# Patient Record
Sex: FEMALE | Race: BLACK OR AFRICAN AMERICAN | Employment: PART TIME | URBAN - METROPOLITAN AREA
[De-identification: names, ages, dates, MRNs, and addresses within clinical notes are randomized per-mention and may not be internally consistent; named-entity substitution may affect disease eponyms.]

---

## 2017-09-11 ENCOUNTER — OFFICE VISIT (OUTPATIENT)
Dept: FAMILY MEDICINE CLINIC | Age: 57
End: 2017-09-11

## 2017-09-11 VITALS
HEART RATE: 70 BPM | BODY MASS INDEX: 44.46 KG/M2 | HEIGHT: 62 IN | TEMPERATURE: 97.3 F | OXYGEN SATURATION: 100 % | RESPIRATION RATE: 17 BRPM | DIASTOLIC BLOOD PRESSURE: 62 MMHG | SYSTOLIC BLOOD PRESSURE: 122 MMHG | WEIGHT: 241.6 LBS

## 2017-09-11 DIAGNOSIS — E55.9 VITAMIN D DEFICIENCY: ICD-10-CM

## 2017-09-11 DIAGNOSIS — Z85.3 HISTORY OF LEFT BREAST CANCER: ICD-10-CM

## 2017-09-11 DIAGNOSIS — Z12.4 CERVICAL CANCER SCREENING: ICD-10-CM

## 2017-09-11 DIAGNOSIS — Z23 ENCOUNTER FOR IMMUNIZATION: ICD-10-CM

## 2017-09-11 DIAGNOSIS — E78.2 MIXED HYPERLIPIDEMIA: ICD-10-CM

## 2017-09-11 DIAGNOSIS — R74.01 TRANSAMINITIS: ICD-10-CM

## 2017-09-11 RX ORDER — METFORMIN HYDROCHLORIDE 500 MG/1
500 TABLET, EXTENDED RELEASE ORAL
Qty: 30 TAB | Refills: 2 | Status: SHIPPED | OUTPATIENT
Start: 2017-09-11 | End: 2017-09-22 | Stop reason: SDUPTHER

## 2017-09-11 RX ORDER — LISINOPRIL 5 MG/1
TABLET ORAL DAILY
COMMUNITY

## 2017-09-11 RX ORDER — ATORVASTATIN CALCIUM 20 MG/1
TABLET, FILM COATED ORAL DAILY
COMMUNITY
End: 2017-09-22 | Stop reason: SDUPTHER

## 2017-09-11 RX ORDER — INSULIN GLARGINE 100 [IU]/ML
INJECTION, SOLUTION SUBCUTANEOUS
COMMUNITY
End: 2017-09-22 | Stop reason: SDUPTHER

## 2017-09-11 RX ORDER — GLIPIZIDE 10 MG/1
5 TABLET ORAL 2 TIMES DAILY
COMMUNITY
End: 2018-06-07 | Stop reason: SDUPTHER

## 2017-09-11 NOTE — LETTER
09/12/17 Melissa Sanford [address] [facility] [address] Dear Melissa Sanford, Welcome to [facility] and thank you for choosing me as your primary care physician. My staff is a skilled and caring team who is committed to providing patients with the best care possible within their new medical home. As a local physician I am excited about welcoming new patients to my practice as well as developing long lasting relationships with established patients and their friends and family members. I look forward to a continued relationship of care, compassion and trust with you. If you have family members or friends who may be searching for a new doctor I would be happy to see them. They may contact my staff and schedule an appointment convenient with their schedule by calling [phone]. My schedule is flexible and offers both early morning appointments beginning at 8:00am as well as same day visits. We understand the busy schedules of life and will work together to assure that their experience is pleasant and meets their medical needs. Thank you for giving me the opportunity to care for you. I look forward to seeing you again and welcome your friends and family as part of our medical home.    
 
 
Sincerely, 
 
 
Salvador Gardner MD

## 2017-09-11 NOTE — PROGRESS NOTES
Louise Rascon is a 62 y.o.  female and presents with Establish Care and Diabetes       Subjective:    Diabetes type 2- not controlled by serum glucose- no recent A1c. No checks of bs at home. Not using lantus. Chronic vertigo- on meclizine. Hyperlipidemia- taking lipitor. No myalgia or abd pain  Obesity- struggles with wt loss. transaminitis- told this was fatty liver. No abd pain. Not sure about other testing for this but had u/s she reports. Vit D deficiency- no longer on a supplement. Assessment/Plan:    Diabetes type 2- need labs but seems uncontrolled. Chronic vertigo- need old records. Referral to ENT. Hyperlipidemia- continue statin. Obesity- encouraged calorie counting and calorie restriction to 1 lbs/week wt loss discussed. Regular wt check discussed- pt has a scale. rtc in after fasting labs done. Diagnoses and all orders for this visit:    1. Uncontrolled type 2 diabetes mellitus with diabetic nephropathy, with long-term current use of insulin (HCC)  -     METABOLIC PANEL, COMPREHENSIVE; Future  -     LIPID PANEL; Future  -     HEMOGLOBIN A1C WITH EAG; Future  -     URINALYSIS W/ RFLX MICROSCOPIC; Future  -     MICROALBUMIN, UR, RAND W/ MICROALBUMIN/CREA RATIO; Future  -     REFERRAL TO DIETITIAN    2. History of left breast cancer    3. Vitamin D deficiency  -     VITAMIN D, 25 HYDROXY; Future    4. Transaminitis  -     METABOLIC PANEL, COMPREHENSIVE; Future  -     CERULOPLASMIN; Future  -     ALPHA-1-ANTITRYPSIN, TOTAL; Future  -     FERRITIN; Future  -     HEP B SURFACE AG; Future  -     HEPATITIS C AB; Future  -     REFERRAL TO DIETITIAN    5. Mixed hyperlipidemia  -     REFERRAL TO DIETITIAN    6. Cervical cancer screening  -     REFERRAL TO GYNECOLOGY    7. Encounter for immunization  -     Influenza virus vaccine (QUADRIVALENT PRES FREE SYRINGE) IM (38337)    Other orders  -     metFORMIN ER (GLUCOPHAGE XR) 500 mg tablet; Take 1 Tab by mouth daily (with dinner).   - HEMOGLOBIN A1C W/O EAG          ROS:  Constitutional: No recent weight change. No weakness/fatigue. No f/c. Skin: No rashes, change in nails/hair, itching   HENT: No HA, dizziness. No hearing loss/tinnitus. No nasal congestion/discharge. Eyes: No change in vision, double/blurred vision or eye pain/redness. Cardiovascular: No CP/palpitations. No FARAH/orthopnea/PND. Respiratory: No cough/sputum, dyspnea, wheezing. Gastointestinal: No dysphagia, reflux. No n/v. No constipation/diarrhea. No melena/rectal bleeding. Genitourinary: No dysuria, urinary hesitancy, nocturia, hematuria. No incontinence. Musculoskeletal: No joint pain/stiffness. No muscle pain/tenderness. Endo: No heat/cold intolerance, no polyuria/polydypsia. Heme: No h/o anemia. No easy bleeding/bruising. Allergy/Immunology: No seasonal rhinitis. Denies frequent colds, sinus/ear infections. Neurological: No seizures/numbness/weakness. No paresthesias. Psychiatric:  No depression, anxiety. PMH:  Past Medical History:   Diagnosis Date    Breast cancer ([facility]     Left    Cancer ([facility] [facility]     Diabetes ([facility] [facility]     Hypercholesterolemia     Ulcerative colitis ([facility] [address]        There is no problem list on file for this patient. PSH:  Past Surgical History:   Procedure Laterality Date    BREAST SURGERY PROCEDURE UNLISTED Left     Tumor removed    HX APPENDECTOMY          SH:  Social History   Substance Use Topics    Smoking status: Never Smoker    Smokeless tobacco: Never Used    Alcohol use No       FH:  Family History   Problem Relation Age of Onset    Alzheimer Mother     Diabetes Father     Hypertension Father     No Known Problems Sister     No Known Problems Brother      Medications/Allergies:    Current Outpatient Prescriptions:     lisinopril (PRINIVIL, ZESTRIL) 5 mg tablet, Take  by mouth daily. , Disp: , Rfl:     glipiZIDE (GLUCOTROL) 10 mg tablet, 10 mg daily. , Disp: , Rfl:     atorvastatin (LIPITOR) 20 mg tablet, Take  by mouth daily. , Disp: , Rfl:     insulin glargine (LANTUS SOLOSTAR) 100 unit/mL (3 mL) inpn, by SubCUTAneous route., Disp: , Rfl:     No Known Allergies    Objective:  Visit Vitals    /62    Pulse 70    Temp 97.3 °F (36.3 °C)    Resp 17    Ht 5' 2\" (1.575 m)    Wt 241 lb 9.6 oz (109.6 kg)    SpO2 100%    BMI 44.19 kg/m2    Body mass index is 44.19 kg/(m^2). Constitutional: Well developed, nourished, no distress, alert, obese. HENT: Exterior ears and tympanic membranes normal bilaterally. Supple neck. No thyromegaly or lymphadenopathy. Oropharynx clear and moist mucous membranes. Eyes: Conjunctiva normal. PERRL. Cardiovascular: S1, S2.  RRR. No murmurs/rubs. No thrills palpated. No carotid bruits. Intact distal pulses. No edema. Pulmonary/Chest Wall: No abnormalities on inspection. Clear to auscultation bilaterally. No wheezing/rhonchi. Normal effort. GI: Soft, nontender, nondistended. Normal active bowel sounds. No  masses on palpation. No hepatosplenomegaly. Musculoskeletal: Gait normal.  Joints without deformity/tenderness. Strength intact bilateral upper and lower ext. Normal ROM all extremities. Neurological: Appropriate. 2+DTR. No focal motor or sensory deficits. Speech normal.   Skin: No lesions/rashes on inspection. Psych: Appropriate affect, judgement and insight. Short-term memory intact.              Health Maintenance:   Health Maintenance   Topic Date Due    Hepatitis C Screening  1960    DTaP/Tdap/Td series (1 - Tdap) 07/08/1981    FOBT Q 1 YEAR AGE 50-75  07/08/2010    INFLUENZA AGE 9 TO ADULT  08/01/2017    BREAST CANCER SCRN MAMMOGRAM  06/16/2019    PAP AKA CERVICAL CYTOLOGY  06/01/2020       Orders Placed This Encounter    METABOLIC PANEL, COMPREHENSIVE     Standing Status:   Future     Standing Expiration Date:   9/12/2018    LIPID PANEL     Standing Status:   Future     Standing Expiration Date:   9/12/2018   [address] [place] [address] HEMOGLOBIN A1C WITH EAG     Standing Status:   Future     Standing Expiration Date:   9/12/2018    URINALYSIS W/ RFLX MICROSCOPIC     Standing Status:   Future     Standing Expiration Date:   9/12/2018    MICROALBUMIN, UR, RAND W/ MICROALBUMIN/CREA RATIO     Standing Status:   Future     Standing Expiration Date:   9/12/2018    VITAMIN D, 25 HYDROXY     Standing Status:   Future     Standing Expiration Date:   9/11/2018    CERULOPLASMIN     Standing Status:   Future     Standing Expiration Date:   9/12/2018    ALPHA-1-ANTITRYPSIN, TOTAL     Standing Status:   Future     Standing Expiration Date:   9/12/2018    FERRITIN     Standing Status:   Future     Standing Expiration Date:   9/12/2018    HEP B SURFACE AG     Standing Status:   Future     Standing Expiration Date:   9/12/2018    HEPATITIS C AB     Standing Status:   Future     Standing Expiration Date:   9/12/2018    REFERRAL TO DIETITIAN     Referral Priority:   Routine     Referral Type:   Consultation     Referral Reason:   Specialty Services Required     Requested Specialty:   Dietitian    REFERRAL TO GYNECOLOGY     Referral Priority:   Routine     Referral Type:   Consultation     Referral Reason:   Specialty Services Required     Referral Location:   [facility] [place]     Referred to Provider:   Cindy Mcclellan MD    lisinopril (PRINIVIL, ZESTRIL) 5 mg tablet     Sig: Take  by mouth daily.  glipiZIDE (GLUCOTROL) 10 mg tablet     Sig: 10 mg daily.  atorvastatin (LIPITOR) 20 mg tablet     Sig: Take  by mouth daily.  insulin glargine (LANTUS SOLOSTAR) 100 unit/mL (3 mL) inpn     Sig: by SubCUTAneous route.  metFORMIN ER (GLUCOPHAGE XR) 500 mg tablet     Sig: Take 1 Tab by mouth daily (with dinner). Dispense:  30 Tab     Refill:  2   Diagnoses and all orders for this visit:    1.  Uncontrolled type 2 diabetes mellitus with diabetic nephropathy, with long-term current use of insulin (HCC)  -     METABOLIC PANEL, COMPREHENSIVE; Future  -     LIPID PANEL; Future  -     HEMOGLOBIN A1C WITH EAG; Future  -     URINALYSIS W/ RFLX MICROSCOPIC; Future  -     MICROALBUMIN, UR, RAND W/ MICROALBUMIN/CREA RATIO; Future  -     REFERRAL TO DIETITIAN    2. History of left breast cancer    3. Vitamin D deficiency  -     VITAMIN D, 25 HYDROXY; Future    4. Transaminitis  -     METABOLIC PANEL, COMPREHENSIVE; Future  -     CERULOPLASMIN; Future  -     ALPHA-1-ANTITRYPSIN, TOTAL; Future  -     FERRITIN; Future  -     HEP B SURFACE AG; Future  -     HEPATITIS C AB; Future  -     REFERRAL TO DIETITIAN    5. Mixed hyperlipidemia  -     REFERRAL TO DIETITIAN    6. Cervical cancer screening  -     REFERRAL TO GYNECOLOGY    7. Encounter for immunization  -     Influenza virus vaccine (QUADRIVALENT PRES FREE SYRINGE) IM (03121)    Other orders  -     metFORMIN ER (GLUCOPHAGE XR) 500 mg tablet; Take 1 Tab by mouth daily (with dinner).   -     HEMOGLOBIN A1C W/O EAG

## 2017-09-11 NOTE — MR AVS SNAPSHOT
Visit Information Date & Time Provider Department Dept. Phone Encounter #  
 9/11/2017 10:00 AM Starr Malik, [address] [facility] [phone] [id number] Follow-up Instructions Return in about 4 weeks (around 10/9/2017) for 30 minute slot and fasting labs in the interim. Neeraj Cagle Upcoming Health Maintenance Date Due Hepatitis C Screening 1960 DTaP/Tdap/Td series (1 - Tdap) 7/8/1981 FOBT Q 1 YEAR AGE 50-75 7/8/2010 INFLUENZA AGE 9 TO ADULT 8/1/2017 BREAST CANCER SCRN MAMMOGRAM 6/16/2019 PAP AKA CERVICAL CYTOLOGY 6/1/2020 Allergies as of 9/11/2017  Review Complete On: 9/11/2017 By: Starr Malik MD  
 No Known Allergies Current Immunizations  Never Reviewed No immunizations on file. Not reviewed this visit You Were Diagnosed With   
  
 Codes Comments Uncontrolled type 2 diabetes mellitus with diabetic nephropathy, with long-term current use of insulin [facility]    -  Primary ICD-10-CM: E11.21, E11.65, Z79.4 ICD-9-CM: 250.42, 583.81, V58.67 History of left breast cancer     ICD-10-CM: Z85.3 ICD-9-CM: V10.3 Vitamin D deficiency     ICD-10-CM: E55.9 ICD-9-CM: 268.9 Transaminitis     ICD-10-CM: R74.0 ICD-9-CM: 790.4 Mixed hyperlipidemia     ICD-10-CM: E78.2 ICD-9-CM: 272.2 Cervical cancer screening     ICD-10-CM: Z12.4 ICD-9-CM: V76.2 Vitals BP Pulse Temp Resp Height(growth percentile) Weight(growth percentile) 122/62 70 97.3 °F (36.3 °C) 17 5' 2\" (1.575 m) 241 lb 9.6 oz (109.6 kg) SpO2 BMI OB Status Smoking Status 100% 44.19 kg/m2 Menopause Never Smoker Vitals History BMI and BSA Data Body Mass Index Body Surface Area  
 44.19 kg/m 2 2.19 m 2 Preferred Pharmacy Pharmacy Name Phone [id number] [address] [phone] Your Updated Medication List  
  
   
 This list is accurate as of: 9/11/17 10:45 AM.  Always use your most recent med list.  
  
  
  
  
 atorvastatin 20 mg tablet Commonly known as:  LIPITOR Take  by mouth daily. glipiZIDE 10 mg tablet Commonly known as:  Clerance Sharps 10 mg daily. LANTUS SOLOSTAR 100 unit/mL (3 mL) Inpn Generic drug:  insulin glargine  
by SubCUTAneous route. lisinopril 5 mg tablet Commonly known as:  Aleisha Edmore Take  by mouth daily. metFORMIN  mg tablet Commonly known as:  GLUCOPHAGE XR Take 1 Tab by mouth daily (with dinner). Prescriptions Sent to Pharmacy Refills  
 metFORMIN ER (GLUCOPHAGE XR) 500 mg tablet 2 Sig: Take 1 Tab by mouth daily (with dinner). Class: Normal  
 Pharmacy: [address] [place] [address] #: [phone] Route: Oral  
  
We Performed the Following REFERRAL TO DIETITIAN [YTK70 Custom] Comments:  
 Please evaluate patient for DM/obesity/hyperlipidemia REFERRAL TO GYNECOLOGY [REF30 Custom] Comments:  
 Please evaluate patient for pap smear Follow-up Instructions Return in about 4 weeks (around 10/9/2017) for 30 minute slot and fasting labs in the interim. Griselda Jarrett To-Do List   
 09/11/2017 Lab:  ALPHA-1-ANTITRYPSIN, TOTAL   
  
 09/11/2017 Lab:  CERULOPLASMIN   
  
 09/11/2017 Lab:  FERRITIN   
  
 09/11/2017 Lab:  HEMOGLOBIN A1C WITH EAG   
  
 09/11/2017 Lab:  HEP B SURFACE AG   
  
 09/11/2017 Lab:  HEPATITIS C AB   
  
 09/11/2017 Lab:  LIPID PANEL   
  
 09/11/2017 Lab:  METABOLIC PANEL, COMPREHENSIVE   
  
 09/11/2017 Lab:  MICROALBUMIN, UR, RAND W/ MICROALBUMIN/CREA RATIO   
  
 09/11/2017 Lab:  URINALYSIS W/ RFLX MICROSCOPIC   
  
 09/11/2017 Lab:  VITAMIN D, 25 HYDROXY Referral Information Referral ID Referred By Referred To  
  
 [id number] MARIBELL Benitez Not Available Visits Status Start Date End Date 1 New Request 9/11/17 9/11/18 If your referral has a status of pending review or denied, additional information will be sent to support the outcome of this decision. Referral ID Referred By Referred To  
 [id number] TROMSØ, [address], [place]  
   [address] [facility] Phone: [phone] Fax: [phone] Visits Status Start Date End Date 1 New Request 9/11/17 9/11/18 If your referral has a status of pending review or denied, additional information will be sent to support the outcome of this decision. Patient Instructions Learning About Meal Planning for Diabetes Why plan your meals? Meal planning can be a key part of managing diabetes. Planning meals and snacks with the right balance of carbohydrate, protein, and fat can help you keep your blood sugar at the target level you set with your doctor. You don't have to eat special foods. You can eat what your family eats, including sweets once in a while. But you do have to pay attention to how often you eat and how much you eat of certain foods. You may want to work with a dietitian or a certified diabetes educator. He or she can give you tips and meal ideas and can answer your questions about meal planning. This health professional can also help you reach a healthy weight if that is one of your goals. What plan is right for you? Your dietitian or diabetes educator may suggest that you start with the plate format or carbohydrate counting. The plate format The plate format is a simple way to help you manage how you eat. You plan meals by learning how much space each food should take on a plate. Using the plate format helps you spread carbohydrate throughout the day. It can make it easier to keep your blood sugar level within your target range. It also helps you see if you're eating healthy portion sizes. To use the plate format, you put non-starchy vegetables on half your plate. Add meat or meat substitutes on one-quarter of the plate. Put a grain or starchy vegetable (such as brown rice or a potato) on the final quarter of the plate. You can add a small piece of fruit and some low-fat or fat-free milk or yogurt, depending on your carbohydrate goal for each meal. 
Here are some tips for using the plate format: · Make sure that you are not using an oversized plate. A 9-inch plate is best. Many restaurants use larger plates. · Get used to using the plate format at home. Then you can use it when you eat out. · Write down your questions about using the plate format. Talk to your doctor, a dietitian, or a diabetes educator about your concerns. Carbohydrate counting With carbohydrate counting, you plan meals based on the amount of carbohydrate in each food. Carbohydrate raises blood sugar higher and more quickly than any other nutrient. It is found in desserts, breads and cereals, and fruit. It's also found in starchy vegetables such as potatoes and corn, grains such as rice and pasta, and milk and yogurt. Spreading carbohydrate throughout the day helps keep your blood sugar levels within your target range. Your daily amount depends on several things, including your weight, how active you are, which diabetes medicines you take, and what your goals are for your blood sugar levels. A registered dietitian or diabetes educator can help you plan how much carbohydrate to include in each meal and snack. A guideline for your daily amount of carbohydrate is: · 45 to 60 grams at each meal. That's about the same as 3 to 4 carbohydrate servings. · 15 to 20 grams at each snack. That's about the same as 1 carbohydrate serving. The Nutrition Facts label on packaged foods tells you how much carbohydrate is in a serving of the food.  First, look at the serving size on the food label. Is that the amount you eat in a serving? All of the nutrition information on a food label is based on that serving size. So if you eat more or less than that, you'll need to adjust the other numbers. Total carbohydrate is the next thing you need to look for on the label. If you count carbohydrate servings, one serving of carbohydrate is 15 grams. For foods that don't come with labels, such as fresh fruits and vegetables, you'll need a guide that lists carbohydrate in these foods. Ask your doctor, dietitian, or diabetes educator about books or other nutrition guides you can use. If you take insulin, you need to know how many grams of carbohydrate are in a meal. This lets you know how much rapid-acting insulin to take before you eat. If you use an insulin pump, you get a constant rate of insulin during the day. So the pump must be programmed at meals to give you extra insulin to cover the rise in blood sugar after meals. When you know how much carbohydrate you will eat, you can take the right amount of insulin. Or, if you always use the same amount of insulin, you need to make sure that you eat the same amount of carbohydrate at meals. If you need more help to understand carbohydrate counting and food labels, ask your doctor, dietitian, or diabetes educator. How do you get started with meal planning? Here are some tips to get started: 
· Plan your meals a week at a time. Don't forget to include snacks too. · Use cookbooks or online recipes to plan several main meals. Plan some quick meals for busy nights. You also can double some recipes that freeze well. Then you can save half for other busy nights when you don't have time to cook. · Make sure you have the ingredients you need for your recipes. If you're running low on basic items, put these items on your shopping list too. · List foods that you use to make breakfasts, lunches, and snacks. List plenty of fruits and vegetables. · Post this list on the refrigerator. Add to it as you think of more things you need. · Take the list to the store to do your weekly shopping. Follow-up care is a key part of your treatment and safety. Be sure to make and go to all appointments, and call your doctor if you are having problems. It's also a good idea to know your test results and keep a list of the medicines you take. Where can you learn more? Go to http://pippa-venkatesh.info/. Catalina Lim in the search box to learn more about \"Learning About Meal Planning for Diabetes. \" Current as of: March 13, 2017 Content Version: 11.3 © 4370-1734 [employer]. Care instructions adapted under license by [employer] (which disclaims liability or warranty for this information). If you have questions about a medical condition or this instruction, always ask your healthcare professional. [place] [address] any warranty or liability for your use of this information. Learning About Low-Carbohydrate Diets for Weight Loss What is a low-carbohydrate diet? Low-carb diets avoid foods that are high in carbohydrate. These high-carb foods include pasta, bread, rice, cereal, fruits, and starchy vegetables. Instead, these diets usually have you eat foods that are high in fat and protein. Many people lose weight quickly on a low-carb diet. But the early weight loss is water. People on this diet often gain the weight back after they start eating carbs again. Not all diet plans are safe or work well. A lot of the evidence shows that low-carb diets aren't healthy. That's because these diets often don't include healthy foods like fruits and vegetables. Losing weight safely means balancing protein, fat, and carbs with every meal and snack. And low-carb diets don't always provide the vitamins, minerals, and fiber you need.  
If you have a serious medical condition, talk to your doctor before you try any diet. These conditions include kidney disease, heart disease, type 2 diabetes, high cholesterol, and high blood pressure. If you are pregnant, it may not be safe for your baby if you are on a low-carb diet. How can you lose weight safely? You might have heard that a diet plan helped another person lose weight. But that doesn't mean that it will work for you. It is very hard to stay on a diet that includes lots of big changes in your eating habits. If you want to get to a healthy weight and stay there, making healthy lifestyle changes will often work better than dieting. These steps can help. · Make a plan for change. Work with your doctor to create a plan that is right for you. · See a dietitian. He or she can show you how to make healthy changes in your eating habits. · Manage stress. If you have a lot of stress in your life, it can be hard to focus on making healthy changes to your daily habits. · Track your food and activity. You are likely to do better at losing weight if you keep track of what you eat and what you do. Follow-up care is a key part of your treatment and safety. Be sure to make and go to all appointments, and call your doctor if you are having problems. It's also a good idea to know your test results and keep a list of the medicines you take. Where can you learn more? Go to http://pippa-venkatesh.info/. Enter A121 in the search box to learn more about \"Learning About Low-Carbohydrate Diets for Weight Loss. \" Current as of: December 8, 2016 Content Version: 11.3 © 6003-1584 [employer]. Care instructions adapted under license by [employer] (which disclaims liability or warranty for this information). If you have questions about a medical condition or this instruction, always ask your healthcare professional. [address] any warranty or liability for your use of this information. DASH Diet: Care Instructions Your Care Instructions The DASH diet is an eating plan that can help lower your blood pressure. DASH stands for Dietary Approaches to Stop Hypertension. Hypertension is high blood pressure. The DASH diet focuses on eating foods that are high in calcium, potassium, and magnesium. These nutrients can lower blood pressure. The foods that are highest in these nutrients are fruits, vegetables, low-fat dairy products, nuts, seeds, and legumes. But taking calcium, potassium, and magnesium supplements instead of eating foods that are high in those nutrients does not have the same effect. The DASH diet also includes whole grains, fish, and poultry. The DASH diet is one of several lifestyle changes your doctor may recommend to lower your high blood pressure. Your doctor may also want you to decrease the amount of sodium in your diet. Lowering sodium while following the DASH diet can lower blood pressure even further than just the DASH diet alone. Follow-up care is a key part of your treatment and safety. Be sure to make and go to all appointments, and call your doctor if you are having problems. It's also a good idea to know your test results and keep a list of the medicines you take. How can you care for yourself at home? Following the DASH diet · Eat 4 to 5 servings of fruit each day. A serving is 1 medium-sized piece of fruit, ½ cup chopped or canned fruit, 1/4 cup dried fruit, or 4 ounces (½ cup) of fruit juice. Choose fruit more often than fruit juice. · Eat 4 to 5 servings of vegetables each day. A serving is 1 cup of lettuce or raw leafy vegetables, ½ cup of chopped or cooked vegetables, or 4 ounces (½ cup) of vegetable juice. Choose vegetables more often than vegetable juice. · Get 2 to 3 servings of low-fat and fat-free dairy each day. A serving is 8 ounces of milk, 1 cup of yogurt, or 1 ½ ounces of cheese. · Eat 6 to 8 servings of grains each day.  A serving is 1 slice of bread, 1 ounce of dry cereal, or ½ cup of cooked rice, pasta, or cooked cereal. Try to choose whole-grain products as much as possible. · Limit lean meat, poultry, and fish to 2 servings each day. A serving is 3 ounces, about the size of a deck of cards. · Eat 4 to 5 servings of nuts, seeds, and legumes (cooked dried beans, lentils, and split peas) each week. A serving is 1/3 cup of nuts, 2 tablespoons of seeds, or ½ cup of cooked beans or peas. · Limit fats and oils to 2 to 3 servings each day. A serving is 1 teaspoon of vegetable oil or 2 tablespoons of salad dressing. · Limit sweets and added sugars to 5 servings or less a week. A serving is 1 tablespoon jelly or jam, ½ cup sorbet, or 1 cup of lemonade. · Eat less than 2,300 milligrams (mg) of sodium a day. If you limit your sodium to 1,500 mg a day, you can lower your blood pressure even more. Tips for success · Start small. Do not try to make dramatic changes to your diet all at once. You might feel that you are missing out on your favorite foods and then be more likely to not follow the plan. Make small changes, and stick with them. Once those changes become habit, add a few more changes. · Try some of the following: ¨ Make it a goal to eat a fruit or vegetable at every meal and at snacks. This will make it easy to get the recommended amount of fruits and vegetables each day. ¨ Try yogurt topped with fruit and nuts for a snack or healthy dessert. ¨ Add lettuce, tomato, cucumber, and onion to sandwiches. ¨ Combine a ready-made pizza crust with low-fat mozzarella cheese and lots of vegetable toppings. Try using tomatoes, squash, spinach, broccoli, carrots, cauliflower, and onions. ¨ Have a variety of cut-up vegetables with a low-fat dip as an appetizer instead of chips and dip. ¨ Sprinkle sunflower seeds or chopped almonds over salads. Or try adding chopped walnuts or almonds to cooked vegetables. ¨ Try some vegetarian meals using beans and peas. Add garbanzo or kidney beans to salads. Make burritos and tacos with mashed daigle beans or black beans. Where can you learn more? Go to http://pippa-venkatesh.info/. Enter N264 in the search box to learn more about \"DASH Diet: Care Instructions. \" Current as of: April 3, 2017 Content Version: 11.3 © 3812-3019 "[employer]. Care instructions adapted under license by [employer] (which disclaims liability or warranty for this information). If you have questions about a medical condition or this instruction, always ask your healthcare professional. [place] [address] any warranty or liability for your use of this information. Learning About the [address] Diet What is the Mediterranean diet? The Mediterranean diet is a style of eating rather than a diet plan. It features foods eaten in [place], [place], [place] and [place], and other countries along the [facility] access hospitale. It emphasizes eating foods like fish, fruits, vegetables, beans, high-fiber breads and whole grains, nuts, and olive oil. This style of eating includes limited red meat, cheese, and sweets. Why choose the Mediterranean diet? A Mediterranean-style diet may improve heart health. It contains more fat than other heart-healthy diets. But the fats are mainly from nuts, unsaturated oils (such as fish oils and olive oil), and certain nut or seed oils (such as canola, soybean, or flaxseed oil). These fats may help protect the heart and blood vessels. How can you get started on the Mediterranean diet? Here are some things you can do to switch to a more Mediterranean way of eating. What to eat · Eat a variety of fruits and vegetables each day, such as grapes, blueberries, tomatoes, broccoli, peppers, figs, olives, spinach, eggplant, beans, lentils, and chickpeas. · Eat a variety of whole-grain foods each day, such as oats, brown rice, and whole wheat bread, pasta, and couscous. · Eat fish at least 2 times a week. Try tuna, salmon, mackerel, lake trout, herring, or sardines. · Eat moderate amounts of low-fat dairy products, such as milk, cheese, or yogurt. · Eat moderate amounts of poultry and eggs. · Choose healthy (unsaturated) fats, such as nuts, olive oil, and certain nut or seed oils like canola, soybean, and flaxseed. · Limit unhealthy (saturated) fats, such as butter, palm oil, and coconut oil. And limit fats found in animal products, such as meat and dairy products made with whole milk. Try to eat red meat only a few times a month in very small amounts. · Limit sweets and desserts to only a few times a week. This includes sugar-sweetened drinks like soda. The Mediterranean diet may also include red wine with your meal1 glass each day for women and up to 2 glasses a day for men. Tips for eating at home · Use herbs, spices, garlic, lemon zest, and citrus juice instead of salt to add flavor to foods. · Add avocado slices to your sandwich instead of guevara. · Have fish for lunch or dinner instead of red meat. Brush the fish with olive oil, and broil or grill it. · Sprinkle your salad with seeds or nuts instead of cheese. · Cook with olive or canola oil instead of butter or oils that are high in saturated fat. · Switch from 2% milk or whole milk to 1% or fat-free milk. · Dip raw vegetables in a vinaigrette dressing or hummus instead of dips made from mayonnaise or sour cream. 
· Have a piece of fruit for dessert instead of a piece of cake. Try baked apples, or have some dried fruit. Tips for eating out · Try broiled, grilled, baked, or poached fish instead of having it fried or breaded. · Ask your  to have your meals prepared with olive oil instead of butter. · Order dishes made with marinara sauce or sauces made from olive oil. Avoid sauces made from cream or mayonnaise. · Choose whole-grain breads, whole wheat pasta and pizza crust, brown rice, beans, and lentils. · Cut back on butter or margarine on bread. Instead, you can dip your bread in a small amount of olive oil. · Ask for a side salad or grilled vegetables instead of french fries or chips. Where can you learn more? Go to http://pippa-venkatesh.info/. Enter [phone] in the search box to learn more about \"Learning About the Mediterranean Diet. \" Current as of: December 29, 2016 Content Version: 11.3 © 2898-1188 [employer]. Care instructions adapted under license by [employer] (which disclaims liability or warranty for this information). If you have questions about a medical condition or this instruction, always ask your healthcare professional. [address] any warranty or liability for your use of this information. Introducing [facility]! [facility] introduces [employer] patient portal. Now you can access parts of your medical record, email your doctor's office, and request medication refills online. 1. In your internet browser, go to [email or web address] 2. Click on the First Time User? Click Here link in the Sign In box. You will see the New Member Sign Up page. 3. Enter your [employer] Access Code exactly as it appears below. You will not need to use this code after youve completed the sign-up process. If you do not sign up before the expiration date, you must request a new code. · [employer] Access Code: [email or web address] Expires: 9/12/2017  3:24 PM 
 
4. Enter the last four digits of your Social Security Number (xxxx) and Date of Birth (mm/dd/yyyy) as indicated and click Submit. You will be taken to the next sign-up page. 5. Create a Lunera Lightingt ID. This will be your [employer] login ID and cannot be changed, so think of one that is secure and easy to remember. 6. Create a [employer] password. You can change your password at any time. 7. Enter your Password Reset Question and Answer. This can be used at a later time if you forget your password. 8. Enter your e-mail address. You will receive e-mail notification when new information is available in [address] 9. Click Sign Up. You can now view and download portions of your medical record. 10. Click the Download Summary menu link to download a portable copy of your medical information. If you have questions, please visit the Frequently Asked Questions section of the [employer] website. Remember, [employer] is NOT to be used for urgent needs. For medical emergencies, dial 911. Now available from your iPhone and Android! Please provide this summary of care documentation to your next provider. Your primary care clinician is listed as NADER Mueller. If you have any questions after today's visit, please call [phone].

## 2017-09-11 NOTE — PATIENT INSTRUCTIONS
Learning About Meal Planning for Diabetes  Why plan your meals? Meal planning can be a key part of managing diabetes. Planning meals and snacks with the right balance of carbohydrate, protein, and fat can help you keep your blood sugar at the target level you set with your doctor. You don't have to eat special foods. You can eat what your family eats, including sweets once in a while. But you do have to pay attention to how often you eat and how much you eat of certain foods. You may want to work with a dietitian or a certified diabetes educator. He or she can give you tips and meal ideas and can answer your questions about meal planning. This health professional can also help you reach a healthy weight if that is one of your goals. What plan is right for you? Your dietitian or diabetes educator may suggest that you start with the plate format or carbohydrate counting. The plate format  The plate format is a simple way to help you manage how you eat. You plan meals by learning how much space each food should take on a plate. Using the plate format helps you spread carbohydrate throughout the day. It can make it easier to keep your blood sugar level within your target range. It also helps you see if you're eating healthy portion sizes. To use the plate format, you put non-starchy vegetables on half your plate. Add meat or meat substitutes on one-quarter of the plate. Put a grain or starchy vegetable (such as brown rice or a potato) on the final quarter of the plate. You can add a small piece of fruit and some low-fat or fat-free milk or yogurt, depending on your carbohydrate goal for each meal.  Here are some tips for using the plate format:  · Make sure that you are not using an oversized plate. A 9-inch plate is best. Many restaurants use larger plates. · Get used to using the plate format at home. Then you can use it when you eat out. · Write down your questions about using the plate format.  Talk to your doctor, a dietitian, or a diabetes educator about your concerns. Carbohydrate counting  With carbohydrate counting, you plan meals based on the amount of carbohydrate in each food. Carbohydrate raises blood sugar higher and more quickly than any other nutrient. It is found in desserts, breads and cereals, and fruit. It's also found in starchy vegetables such as potatoes and corn, grains such as rice and pasta, and milk and yogurt. Spreading carbohydrate throughout the day helps keep your blood sugar levels within your target range. Your daily amount depends on several things, including your weight, how active you are, which diabetes medicines you take, and what your goals are for your blood sugar levels. A registered dietitian or diabetes educator can help you plan how much carbohydrate to include in each meal and snack. A guideline for your daily amount of carbohydrate is:  · 45 to 60 grams at each meal. That's about the same as 3 to 4 carbohydrate servings. · 15 to 20 grams at each snack. That's about the same as 1 carbohydrate serving. The Nutrition Facts label on packaged foods tells you how much carbohydrate is in a serving of the food. First, look at the serving size on the food label. Is that the amount you eat in a serving? All of the nutrition information on a food label is based on that serving size. So if you eat more or less than that, you'll need to adjust the other numbers. Total carbohydrate is the next thing you need to look for on the label. If you count carbohydrate servings, one serving of carbohydrate is 15 grams. For foods that don't come with labels, such as fresh fruits and vegetables, you'll need a guide that lists carbohydrate in these foods. Ask your doctor, dietitian, or diabetes educator about books or other nutrition guides you can use.   If you take insulin, you need to know how many grams of carbohydrate are in a meal. This lets you know how much rapid-acting insulin to take before you eat. If you use an insulin pump, you get a constant rate of insulin during the day. So the pump must be programmed at meals to give you extra insulin to cover the rise in blood sugar after meals. When you know how much carbohydrate you will eat, you can take the right amount of insulin. Or, if you always use the same amount of insulin, you need to make sure that you eat the same amount of carbohydrate at meals. If you need more help to understand carbohydrate counting and food labels, ask your doctor, dietitian, or diabetes educator. How do you get started with meal planning? Here are some tips to get started:  · Plan your meals a week at a time. Don't forget to include snacks too. · Use cookbooks or online recipes to plan several main meals. Plan some quick meals for busy nights. You also can double some recipes that freeze well. Then you can save half for other busy nights when you don't have time to cook. · Make sure you have the ingredients you need for your recipes. If you're running low on basic items, put these items on your shopping list too. · List foods that you use to make breakfasts, lunches, and snacks. List plenty of fruits and vegetables. · Post this list on the refrigerator. Add to it as you think of more things you need. · Take the list to the store to do your weekly shopping. Follow-up care is a key part of your treatment and safety. Be sure to make and go to all appointments, and call your doctor if you are having problems. It's also a good idea to know your test results and keep a list of the medicines you take. Where can you learn more? Go to http://pippa-venkatesh.info/. [place] in the search box to learn more about \"Learning About Meal Planning for Diabetes. \"  Current as of: March 13, 2017  Content Version: 11.3  © 6628-8298 [employer].  Care instructions adapted under license by [employer] (which disclaims liability or warranty for this information). If you have questions about a medical condition or this instruction, always ask your healthcare professional. [address] any warranty or liability for your use of this information. Learning About Low-Carbohydrate Diets for Weight Loss  What is a low-carbohydrate diet? Low-carb diets avoid foods that are high in carbohydrate. These high-carb foods include pasta, bread, rice, cereal, fruits, and starchy vegetables. Instead, these diets usually have you eat foods that are high in fat and protein. Many people lose weight quickly on a low-carb diet. But the early weight loss is water. People on this diet often gain the weight back after they start eating carbs again. Not all diet plans are safe or work well. A lot of the evidence shows that low-carb diets aren't healthy. That's because these diets often don't include healthy foods like fruits and vegetables. Losing weight safely means balancing protein, fat, and carbs with every meal and snack. And low-carb diets don't always provide the vitamins, minerals, and fiber you need. If you have a serious medical condition, talk to your doctor before you try any diet. These conditions include kidney disease, heart disease, type 2 diabetes, high cholesterol, and high blood pressure. If you are pregnant, it may not be safe for your baby if you are on a low-carb diet. How can you lose weight safely? You might have heard that a diet plan helped another person lose weight. But that doesn't mean that it will work for you. It is very hard to stay on a diet that includes lots of big changes in your eating habits. If you want to get to a healthy weight and stay there, making healthy lifestyle changes will often work better than dieting. These steps can help. · Make a plan for change. Work with your doctor to create a plan that is right for you. · See a dietitian.  He or she can show you how to make healthy changes in your eating habits. · Manage stress. If you have a lot of stress in your life, it can be hard to focus on making healthy changes to your daily habits. · Track your food and activity. You are likely to do better at losing weight if you keep track of what you eat and what you do. Follow-up care is a key part of your treatment and safety. Be sure to make and go to all appointments, and call your doctor if you are having problems. It's also a good idea to know your test results and keep a list of the medicines you take. Where can you learn more? Go to http://pippa-venkatesh.info/. Enter A121 in the search box to learn more about \"Learning About Low-Carbohydrate Diets for Weight Loss. \"  Current as of: December 8, 2016  Content Version: 11.3  © 2592-7678 [employer]. Care instructions adapted under license by [employer] (which disclaims liability or warranty for this information). If you have questions about a medical condition or this instruction, always ask your healthcare professional. [address] any warranty or liability for your use of this information. DASH Diet: Care Instructions  Your Care Instructions  The DASH diet is an eating plan that can help lower your blood pressure. DASH stands for Dietary Approaches to Stop Hypertension. Hypertension is high blood pressure. The DASH diet focuses on eating foods that are high in calcium, potassium, and magnesium. These nutrients can lower blood pressure. The foods that are highest in these nutrients are fruits, vegetables, low-fat dairy products, nuts, seeds, and legumes. But taking calcium, potassium, and magnesium supplements instead of eating foods that are high in those nutrients does not have the same effect. The DASH diet also includes whole grains, fish, and poultry. The DASH diet is one of several lifestyle changes your doctor may recommend to lower your high blood pressure.  Your doctor may also want you to decrease the amount of sodium in your diet. Lowering sodium while following the DASH diet can lower blood pressure even further than just the DASH diet alone. Follow-up care is a key part of your treatment and safety. Be sure to make and go to all appointments, and call your doctor if you are having problems. It's also a good idea to know your test results and keep a list of the medicines you take. How can you care for yourself at home? Following the DASH diet  · Eat 4 to 5 servings of fruit each day. A serving is 1 medium-sized piece of fruit, ½ cup chopped or canned fruit, 1/4 cup dried fruit, or 4 ounces (½ cup) of fruit juice. Choose fruit more often than fruit juice. · Eat 4 to 5 servings of vegetables each day. A serving is 1 cup of lettuce or raw leafy vegetables, ½ cup of chopped or cooked vegetables, or 4 ounces (½ cup) of vegetable juice. Choose vegetables more often than vegetable juice. · Get 2 to 3 servings of low-fat and fat-free dairy each day. A serving is 8 ounces of milk, 1 cup of yogurt, or 1 ½ ounces of cheese. · Eat 6 to 8 servings of grains each day. A serving is 1 slice of bread, 1 ounce of dry cereal, or ½ cup of cooked rice, pasta, or cooked cereal. Try to choose whole-grain products as much as possible. · Limit lean meat, poultry, and fish to 2 servings each day. A serving is 3 ounces, about the size of a deck of cards. · Eat 4 to 5 servings of nuts, seeds, and legumes (cooked dried beans, lentils, and split peas) each week. A serving is 1/3 cup of nuts, 2 tablespoons of seeds, or ½ cup of cooked beans or peas. · Limit fats and oils to 2 to 3 servings each day. A serving is 1 teaspoon of vegetable oil or 2 tablespoons of salad dressing. · Limit sweets and added sugars to 5 servings or less a week. A serving is 1 tablespoon jelly or jam, ½ cup sorbet, or 1 cup of lemonade. · Eat less than 2,300 milligrams (mg) of sodium a day.  If you limit your sodium to 1,500 mg a day, you can lower your blood pressure even more. Tips for success  · Start small. Do not try to make dramatic changes to your diet all at once. You might feel that you are missing out on your favorite foods and then be more likely to not follow the plan. Make small changes, and stick with them. Once those changes become habit, add a few more changes. · Try some of the following:  ¨ Make it a goal to eat a fruit or vegetable at every meal and at snacks. This will make it easy to get the recommended amount of fruits and vegetables each day. ¨ Try yogurt topped with fruit and nuts for a snack or healthy dessert. ¨ Add lettuce, tomato, cucumber, and onion to sandwiches. ¨ Combine a ready-made pizza crust with low-fat mozzarella cheese and lots of vegetable toppings. Try using tomatoes, squash, spinach, broccoli, carrots, cauliflower, and onions. ¨ Have a variety of cut-up vegetables with a low-fat dip as an appetizer instead of chips and dip. ¨ Sprinkle sunflower seeds or chopped almonds over salads. Or try adding chopped walnuts or almonds to cooked vegetables. ¨ Try some vegetarian meals using beans and peas. Add garbanzo or kidney beans to salads. Make burritos and tacos with mashed daigle beans or black beans. Where can you learn more? Go to http://pippa-venkatesh.info/. Enter X563 in the search box to learn more about \"DASH Diet: Care Instructions. \"  Current as of: April 3, 2017  Content Version: 11.3  © 2245-5345 [employer]. Care instructions adapted under license by [employer] (which disclaims liability or warranty for this information). If you have questions about a medical condition or this instruction, always ask your healthcare professional. [address]  any warranty or liability for your use of this information. Learning About the [address] Diet  What is the Mediterranean diet?   The Mediterranean diet is a style of eating rather than a diet plan. It features foods eaten in [place], [place], [place], and other countries along the [facility]. It emphasizes eating foods like fish, fruits, vegetables, beans, high-fiber breads and whole grains, nuts, and olive oil. This style of eating includes limited red meat, cheese, and sweets. Why choose the Mediterranean diet? A Mediterranean-style diet may improve heart health. It contains more fat than other heart-healthy diets. But the fats are mainly from nuts, unsaturated oils (such as fish oils and olive oil), and certain nut or seed oils (such as canola, soybean, or flaxseed oil). These fats may help protect the heart and blood vessels. How can you get started on the Mediterranean diet? Here are some things you can do to switch to a more Mediterranean way of eating. What to eat  · Eat a variety of fruits and vegetables each day, such as grapes, blueberries, tomatoes, broccoli, peppers, figs, olives, spinach, eggplant, beans, lentils, and chickpeas. · Eat a variety of whole-grain foods each day, such as oats, brown rice, and whole wheat bread, pasta, and couscous. · Eat fish at least 2 times a week. Try tuna, salmon, mackerel, lake trout, herring, or sardines. · Eat moderate amounts of low-fat dairy products, such as milk, cheese, or yogurt. · Eat moderate amounts of poultry and eggs. · Choose healthy (unsaturated) fats, such as nuts, olive oil, and certain nut or seed oils like canola, soybean, and flaxseed. · Limit unhealthy (saturated) fats, such as butter, palm oil, and coconut oil. And limit fats found in animal products, such as meat and dairy products made with whole milk. Try to eat red meat only a few times a month in very small amounts. · Limit sweets and desserts to only a few times a week. This includes sugar-sweetened drinks like soda.   The Mediterranean diet may also include red wine with your meal1 glass each day for women and up to 2 glasses a day for men. Tips for eating at home  · Use herbs, spices, garlic, lemon zest, and citrus juice instead of salt to add flavor to foods. · Add avocado slices to your sandwich instead of guevara. · Have fish for lunch or dinner instead of red meat. Brush the fish with olive oil, and broil or grill it. · Sprinkle your salad with seeds or nuts instead of cheese. · Cook with olive or canola oil instead of butter or oils that are high in saturated fat. · Switch from 2% milk or whole milk to 1% or fat-free milk. · Dip raw vegetables in a vinaigrette dressing or hummus instead of dips made from mayonnaise or sour cream.  · Have a piece of fruit for dessert instead of a piece of cake. Try baked apples, or have some dried fruit. Tips for eating out  · Try broiled, grilled, baked, or poached fish instead of having it fried or breaded. · Ask your  to have your meals prepared with olive oil instead of butter. · Order dishes made with marinara sauce or sauces made from olive oil. Avoid sauces made from cream or mayonnaise. · Choose whole-grain breads, whole wheat pasta and pizza crust, brown rice, beans, and lentils. · Cut back on butter or margarine on bread. Instead, you can dip your bread in a small amount of olive oil. · Ask for a side salad or grilled vegetables instead of french fries or chips. Where can you learn more? Go to http://pippa-venkatesh.info/. Enter [phone] in the search box to learn more about \"Learning About the Mediterranean Diet. \"  Current as of: December 29, 2016  Content Version: 11.3  © 2526-9543 [employer]. Care instructions adapted under license by [employer] (which disclaims liability or warranty for this information). If you have questions about a medical condition or this instruction, always ask your healthcare professional. [address] any warranty or liability for your use of this information.     Vaccine Information Statement    Influenza (Flu) Vaccine (Inactivated or Recombinant): What you need to know    Many Vaccine Information Statements are available in Faroese and other languages. See www.immunize.org/vis  Hojas de Información Sobre Vacunas están disponibles en Español y en muchos otros idiomas. Visite www.immunize.org/vis    1. Why get vaccinated? Influenza (flu) is a contagious disease that spreads around the [facility] every year, usually between October and May. Flu is caused by influenza viruses, and is spread mainly by coughing, sneezing, and close contact. Anyone can get flu. Flu strikes suddenly and can last several days. Symptoms vary by age, but can include:   fever/chills   sore throat   muscle aches   fatigue   cough   headache    runny or stuffy nose    Flu can also lead to pneumonia and blood infections, and cause diarrhea and seizures in children. If you have a medical condition, such as heart or lung disease, flu can make it worse. Flu is more dangerous for some people. Infants and young children, people 72years of age and older, pregnant women, and people with certain health conditions or a weakened immune system are at greatest risk. Each year thousands of people in the [facility] die from flu, and many more are hospitalized. Flu vaccine can:   keep you from getting flu,   make flu less severe if you do get it, and   keep you from spreading flu to your family and other people. 2. Inactivated and recombinant flu vaccines    A dose of flu vaccine is recommended every flu season. Children 6 months through 6years of age may need two doses during the same flu season. Everyone else needs only one dose each flu season.        Some inactivated flu vaccines contain a very small amount of a mercury-based preservative called thimerosal. Studies have not shown thimerosal in vaccines to be harmful, but flu vaccines that do not contain thimerosal are available. There is no live flu virus in flu shots. They cannot cause the flu. There are many flu viruses, and they are always changing. Each year a new flu vaccine is made to protect against three or four viruses that are likely to cause disease in the upcoming flu season. But even when the vaccine doesnt exactly match these viruses, it may still provide some protection    Flu vaccine cannot prevent:   flu that is caused by a virus not covered by the vaccine, or   illnesses that look like flu but are not. It takes about 2 weeks for protection to develop after vaccination, and protection lasts through the flu season. 3. Some people should not get this vaccine    Tell the person who is giving you the vaccine:     If you have any severe, life-threatening allergies. If you ever had a life-threatening allergic reaction after a dose of flu vaccine, or have a severe allergy to any part of this vaccine, you may be advised not to get vaccinated. Most, but not all, types of flu vaccine contain a small amount of egg protein.  If you ever had Guillain-Barré Syndrome (also called GBS). Some people with a history of GBS should not get this vaccine. This should be discussed with your doctor.  If you are not feeling well. It is usually okay to get flu vaccine when you have a mild illness, but you might be asked to come back when you feel better. 4. Risks of a vaccine reaction    With any medicine, including vaccines, there is a chance of reactions. These are usually mild and go away on their own, but serious reactions are also possible. Most people who get a flu shot do not have any problems with it.      Minor problems following a flu shot include:    soreness, redness, or swelling where the shot was given     hoarseness   sore, red or itchy eyes   cough   fever   aches   headache   itching   fatigue  If these problems occur, they usually begin soon after the shot and last 1 or 2 days. More serious problems following a flu shot can include the following:     There may be a small increased risk of Guillain-Barré Syndrome (GBS) after inactivated flu vaccine. This risk has been estimated at 1 or 2 additional cases per million people vaccinated. This is much lower than the risk of severe complications from flu, which can be prevented by flu vaccine.  Young children who get the flu shot along with pneumococcal vaccine (PCV13) and/or DTaP vaccine at the same time might be slightly more likely to have a seizure caused by fever. Ask your doctor for more information. Tell your doctor if a child who is getting flu vaccine has ever had a seizure. Problems that could happen after any injected vaccine:      People sometimes faint after a medical procedure, including vaccination. Sitting or lying down for about 15 minutes can help prevent fainting, and injuries caused by a fall. Tell your doctor if you feel dizzy, or have vision changes or ringing in the ears.  Some people get severe pain in the shoulder and have difficulty moving the arm where a shot was given. This happens very rarely.  Any medication can cause a severe allergic reaction. Such reactions from a vaccine are very rare, estimated at about 1 in a million doses, and would happen within a few minutes to a few hours after the vaccination. As with any medicine, there is a very remote chance of a vaccine causing a serious injury or death. The safety of vaccines is always being monitored. For more information, visit: www.cdc.gov/vaccinesafety/    5. What if there is a serious reaction? What should I look for?  Look for anything that concerns you, such as signs of a severe allergic reaction, very high fever, or unusual behavior.     Signs of a severe allergic reaction can include hives, swelling of the face and throat, difficulty breathing, a fast heartbeat, dizziness, and weakness  usually within a few minutes to a few hours after the vaccination. What should I do?  If you think it is a severe allergic reaction or other emergency that cant wait, call 9-1-1 and get the person to the nearest hospital. Otherwise, call your doctor.  Reactions should be reported to the Vaccine Adverse Event Reporting System (VAERS). Your doctor should file this report, or you can do it yourself through  the VAERS web site at www.vaers. [facility].gov, or by calling 6-835.179.9029. VAERS does not give medical advice. 6. The National Vaccine Injury Compensation Program    The [facility] Vaccine Injury Compensation Program (VICP) is a federal program that was created to compensate people who may have been injured by certain vaccines. Persons who believe they may have been injured by a vaccine can learn about the program and about filing a claim by calling 6-854.461.8534 or visiting the [employer] website at [email or web address]/[email or web address]. There is a time limit to file a claim for compensation. 7. How can I learn more?  Ask your healthcare provider. He or she can give you the vaccine package insert or suggest other sources of information.  Call your local or state health department.  Contact the Centers for Disease Control and Prevention (CDC):  - Call 2-557.469.2181 (1-800-CDC-INFO) or  - Visit CDCs website at www.cdc.gov/flu    Vaccine Information Statement   Inactivated Influenza Vaccine   8/7/2015  [address] ALDO Juarez [id number]    Department of Health and Human Services  Centers for Disease Control and Prevention    Office Use Only

## 2017-09-12 LAB
25(OH)D3 SERPL-MCNC: 18.8 NG/ML (ref 32–100)
A-G RATIO,AGRAT: 1.8 RATIO (ref 1.1–2.6)
ALBUMIN SERPL-MCNC: 4.4 G/DL (ref 3.5–5)
ALP SERPL-CCNC: 99 U/L (ref 25–115)
ALT SERPL-CCNC: 41 U/L (ref 5–40)
ANION GAP SERPL CALC-SCNC: 19 MMOL/L
AST SERPL W P-5'-P-CCNC: 26 U/L (ref 10–37)
AVG GLU, 10930: 309 MG/DL (ref 91–123)
BILIRUB SERPL-MCNC: 0.4 MG/DL (ref 0.2–1.2)
BILIRUB UR QL: NEGATIVE
BUN SERPL-MCNC: 12 MG/DL (ref 6–22)
CALCIUM SERPL-MCNC: 9.6 MG/DL (ref 8.4–10.5)
CHLORIDE SERPL-SCNC: 93 MMOL/L (ref 98–110)
CHOLEST SERPL-MCNC: 176 MG/DL (ref 110–200)
CO2 SERPL-SCNC: 23 MMOL/L (ref 20–32)
CREAT SERPL-MCNC: 0.9 MG/DL (ref 0.5–1.2)
CREATININE, URINE: 60 MG/DL
EPITHELIAL,EPSU: ABNORMAL /HPF (ref 0–2)
FERRITIN SERPL-MCNC: 199 NG/ML (ref 10–291)
GFRAA, 66117: >60
GFRNA, 66118: >60
GLOBULIN,GLOB: 2.5 G/DL (ref 2–4)
GLUCOSE SERPL-MCNC: 437 MG/DL (ref 65–99)
GLUCOSE UR QL: ABNORMAL MG/DL
HBA1C MFR BLD HPLC: 12.4 % (ref 4.8–5.9)
HCV AB SER IA-ACNC: NORMAL
HDLC SERPL-MCNC: 51 MG/DL (ref 40–59)
HEP B SURFACE AG SCRN, 006510: NORMAL
HGB UR QL STRIP: NEGATIVE
KETONES UR QL STRIP.AUTO: NEGATIVE MG/DL
LDLC SERPL CALC-MCNC: 104 MG/DL (ref 50–99)
LEUKOCYTE ESTERASE: NEGATIVE
MICROALB/CREAT RATIO, 140286: NORMAL MCG/MG OF CREATININE (ref 0–30)
MICROALBUMIN,URINE RANDOM 140054: <12 UG/ML (ref 0.1–17)
NITRITE UR QL STRIP.AUTO: NEGATIVE
PH UR STRIP: 5 PH (ref 5–8)
POTASSIUM SERPL-SCNC: 4.9 MMOL/L (ref 3.5–5.5)
PROT SERPL-MCNC: 6.9 G/DL (ref 6.4–8.3)
PROT UR QL STRIP: NEGATIVE MG/DL
RBC #/AREA URNS HPF: ABNORMAL /HPF
SODIUM SERPL-SCNC: 135 MMOL/L (ref 133–145)
SP GR UR: 1.03 (ref 1–1.03)
TRIGL SERPL-MCNC: 109 MG/DL (ref 40–149)
UROBILINOGEN UR STRIP-MCNC: <2 MG/DL
VLDLC SERPL CALC-MCNC: 22 MG/DL (ref 8–30)
WBC URNS QL MICRO: ABNORMAL /HPF (ref 0–2)

## 2017-09-13 LAB
ALPHA-1 ANTITRYPSIN,A1ATR: 130 MG/DL
CERULOPLASMIN,CERUL: 27 MG/DL

## 2017-09-22 ENCOUNTER — OFFICE VISIT (OUTPATIENT)
Dept: FAMILY MEDICINE CLINIC | Age: 57
End: 2017-09-22

## 2017-09-22 ENCOUNTER — PATIENT OUTREACH (OUTPATIENT)
Dept: FAMILY MEDICINE CLINIC | Age: 57
End: 2017-09-22

## 2017-09-22 VITALS
BODY MASS INDEX: 44.72 KG/M2 | RESPIRATION RATE: 16 BRPM | HEART RATE: 63 BPM | SYSTOLIC BLOOD PRESSURE: 116 MMHG | WEIGHT: 243 LBS | HEIGHT: 62 IN | DIASTOLIC BLOOD PRESSURE: 69 MMHG | TEMPERATURE: 96.2 F

## 2017-09-22 DIAGNOSIS — R74.01 TRANSAMINITIS: ICD-10-CM

## 2017-09-22 DIAGNOSIS — E78.2 MIXED HYPERLIPIDEMIA: ICD-10-CM

## 2017-09-22 RX ORDER — INSULIN GLARGINE 100 [IU]/ML
10 INJECTION, SOLUTION SUBCUTANEOUS
Qty: 15 ML | Refills: 3 | Status: SHIPPED | OUTPATIENT
Start: 2017-09-22

## 2017-09-22 RX ORDER — METFORMIN HYDROCHLORIDE 500 MG/1
1000 TABLET, EXTENDED RELEASE ORAL
Qty: 60 TAB | Refills: 2 | Status: SHIPPED | OUTPATIENT
Start: 2017-09-22 | End: 2017-12-22 | Stop reason: SDUPTHER

## 2017-09-22 RX ORDER — ATORVASTATIN CALCIUM 40 MG/1
40 TABLET, FILM COATED ORAL DAILY
Qty: 90 TAB | Refills: 3 | Status: SHIPPED | OUTPATIENT
Start: 2017-09-22

## 2017-09-22 RX ORDER — ASPIRIN 325 MG
1 TABLET, DELAYED RELEASE (ENTERIC COATED) ORAL
Qty: 12 CAP | Refills: 0 | Status: SHIPPED | OUTPATIENT
Start: 2017-09-22 | End: 2017-12-10 | Stop reason: SDUPTHER

## 2017-09-22 NOTE — PROGRESS NOTES
Clive Robertson is a 62 y.o. female and presents with Follow Up Chronic Condition; Diabetes; Cholesterol Problem; and Results (Labs)       Subjective:    Diabetes type 2- uncontrolled. No home blood sugars checked. Denies polyuria or polydipsia. Not using lantus. Assessment/Plan:    Diabetes type 2- uncontrolled- discussed diet/wt loss/exercise in detail. start lantus. 10 units. Increase regularly discussed to get bs under 200. Hypoglycemia sx and risks also discussed. Long term complications and increased risk of these with uncontrolled DM also discussed. RTC in 1 weeks  Orders Placed This Encounter    insulin glargine (LANTUS SOLOSTAR) 100 unit/mL (3 mL) inpn     Sig: 10 Units by SubCUTAneous route nightly. Dispense:  15 mL     Refill:  3    Insulin Needles, Disposable, 30 gauge x 1/3\"     Sig: Use to inject lantus daily sq. Dispense:  100 Pen Needle     Refill:  11    atorvastatin (LIPITOR) 40 mg tablet     Sig: Take 1 Tab by mouth daily. Dispense:  90 Tab     Refill:  3    Cholecalciferol, Vitamin D3, 50,000 unit cap     Sig: Take 1 Cap by mouth every seven (7) days. Dispense:  12 Cap     Refill:  0    metFORMIN ER (GLUCOPHAGE XR) 500 mg tablet     Sig: Take 2 Tabs by mouth daily (with dinner). Dispense:  60 Tab     Refill:  2           ROS:  Negative except as mentioned above  Cardiac- no chest pain or palpitations  Pulmonary- no sob or wheezes  GI- no n/v or diarrhea. SH:  Social History   Substance Use Topics    Smoking status: Never Smoker    Smokeless tobacco: Never Used    Alcohol use No         Medications/Allergies:  Current Outpatient Prescriptions on File Prior to Visit   Medication Sig Dispense Refill    lisinopril (PRINIVIL, ZESTRIL) 5 mg tablet Take  by mouth daily.  glipiZIDE (GLUCOTROL) 10 mg tablet 10 mg daily.  atorvastatin (LIPITOR) 20 mg tablet Take  by mouth daily.       metFORMIN ER (GLUCOPHAGE XR) 500 mg tablet Take 1 Tab by mouth daily (with dinner). 30 Tab 2    insulin glargine (LANTUS SOLOSTAR) 100 unit/mL (3 mL) inpn by SubCUTAneous route. No current facility-administered medications on file prior to visit. No Known Allergies    Objective:  Visit Vitals    /69    Pulse 63    Temp 96.2 °F (35.7 °C) (Oral)    Resp 16    Ht 5' 2\" (1.575 m)    Wt 243 lb (110.2 kg)    BMI 44.45 kg/m2    Body mass index is 44.45 kg/(m^2). Constitutional: Well developed, nourished, no distress, alert   CV: S1, S2.  RRR. No murmurs/rubs. No thrills palpated. No carotid bruits. Intact distal pulses. No edema. Pulm: No abnormalities on inspection. Clear to auscultation bilaterally. No wheezing/rhonchi. Normal effort. GI: Soft, nontender, nondistended. Normal active bowel sounds. No  masses on palpation. No hepatosplenomegaly.

## 2017-09-22 NOTE — MR AVS SNAPSHOT
Visit Information Date & Time Provider Department Dept. Phone Encounter #  
 9/22/2017  9:00 AM Meghan Meraz, [address] [facility] [phone] Follow-up Instructions Return in about 1 week (around 9/29/2017). Your Appointments 9/27/2017  3:00 PM  
NEW PATIENT ANNUAL with Todd Ashraf MD  
[address] [place] [address] ([address] [place] [address]) Appt Note: NP-ANNUAL, ID, INS. CARD, ARRIVE 15 MIN EARLY, APPT LOCATION CONFIRMED  
 [facility] [phone]  
  
   
 [phone] Upcoming Health Maintenance Date Due  
 FOOT EXAM Q1 7/8/1970 EYE EXAM RETINAL OR DILATED Q1 7/8/1970 FOBT Q 1 YEAR AGE 50-75 7/8/2010 Pneumococcal 19-64 Medium Risk (1 of 1 - PPSV23) 11/14/2017* DTaP/Tdap/Td series (1 - Tdap) 11/14/2017* HEMOGLOBIN A1C Q6M 3/11/2018 MICROALBUMIN Q1 9/11/2018 LIPID PANEL Q1 9/11/2018 BREAST CANCER SCRN MAMMOGRAM 6/16/2019 PAP AKA CERVICAL CYTOLOGY 6/1/2020 *Topic was postponed. The date shown is not the original due date. Allergies as of 9/22/2017  Review Complete On: 9/22/2017 By: Meghan Meraz MD  
 No Known Allergies Current Immunizations  Reviewed on 9/11/2017 Name Date Influenza Vaccine (Quad) PF 9/11/2017 Not reviewed this visit You Were Diagnosed With   
  
 Codes Comments Uncontrolled type 2 diabetes mellitus without complication, with long-term current use of insulin ([facility] [address]    -  Primary ICD-10-CM: E11.65, Z79.4 ICD-9-CM: 250.02, V58.67 Mixed hyperlipidemia     ICD-10-CM: E78.2 ICD-9-CM: 272.2 Transaminitis     ICD-10-CM: R74.0 ICD-9-CM: 790.4 Vitals BP Pulse Temp Resp Height(growth percentile) Weight(growth percentile) 116/69 63 96.2 °F (35.7 °C) (Oral) 16 5' 2\" (1.575 m) 243 lb (110.2 kg) BMI OB Status Smoking Status 44.45 kg/m2 Menopause Never Smoker Vitals History BMI and BSA Data Body Mass Index Body Surface Area 44.45 kg/m 2 2.2 m 2 Preferred Pharmacy Pharmacy Name Phone [id number], [address] [place] Client [phone] Your Updated Medication List  
  
   
This list is accurate as of: 9/22/17  9:32 AM.  Always use your most recent med list.  
  
  
  
  
 atorvastatin 40 mg tablet Commonly known as:  LIPITOR Take 1 Tab by mouth daily. Cholecalciferol (Vitamin D3) 50,000 unit Cap Take 1 Cap by mouth every seven (7) days. glipiZIDE 10 mg tablet Commonly known as:  Mariaelena Lat 10 mg daily. insulin glargine 100 unit/mL (3 mL) Inpn Commonly known as:  LANTUS SOLOSTAR  
10 Units by SubCUTAneous route nightly. Insulin Needles (Disposable) 30 gauge x 1/3\" Use to inject lantus daily sq.  
  
 lisinopril 5 mg tablet Commonly known as:  Yandel Hails Take  by mouth daily. metFORMIN  mg tablet Commonly known as:  GLUCOPHAGE XR Take 2 Tabs by mouth daily (with dinner). Prescriptions Sent to Pharmacy Refills  
 insulin glargine (LANTUS SOLOSTAR) 100 unit/mL (3 mL) inpn 3 Sig: 10 Units by SubCUTAneous route nightly. Class: Normal  
 Pharmacy: [address] [place] [address] Ph #: [phone] Route: SubCUTAneous Insulin Needles, Disposable, 30 gauge x 1/3\" 11 Sig: Use to inject lantus daily sq. Class: Normal  
 Pharmacy: [place] [address] Ph #: [phone]  
 atorvastatin (LIPITOR) 40 mg tablet 3 Sig: Take 1 Tab by mouth daily. Class: Normal  
 Pharmacy: [address] [place] [address] Ph #: [phone] Route: Oral  
 Cholecalciferol, Vitamin D3, 50,000 unit cap 0 Sig: Take 1 Cap by mouth every seven (7) days. Class: Normal  
 Pharmacy: [address] [place] [address] Ph #: [phone]  Route: Oral  
 metFORMIN ER (GLUCOPHAGE XR) 500 mg tablet 2 Sig: Take 2 Tabs by mouth daily (with dinner). Class: Normal  
 Pharmacy: [address] [place] [address], [address] [place] [address] #: [phone] Route: Oral  
  
Follow-up Instructions Return in about 1 week (around 9/29/2017). Patient Instructions DASH Diet: Care Instructions Your Care Instructions The DASH diet is an eating plan that can help lower your blood pressure. DASH stands for Dietary Approaches to Stop Hypertension. Hypertension is high blood pressure. The DASH diet focuses on eating foods that are high in calcium, potassium, and magnesium. These nutrients can lower blood pressure. The foods that are highest in these nutrients are fruits, vegetables, low-fat dairy products, nuts, seeds, and legumes. But taking calcium, potassium, and magnesium supplements instead of eating foods that are high in those nutrients does not have the same effect. The DASH diet also includes whole grains, fish, and poultry. The DASH diet is one of several lifestyle changes your doctor may recommend to lower your high blood pressure. Your doctor may also want you to decrease the amount of sodium in your diet. Lowering sodium while following the DASH diet can lower blood pressure even further than just the DASH diet alone. Follow-up care is a key part of your treatment and safety. Be sure to make and go to all appointments, and call your doctor if you are having problems. It's also a good idea to know your test results and keep a list of the medicines you take. How can you care for yourself at home? Following the DASH diet · Eat 4 to 5 servings of fruit each day. A serving is 1 medium-sized piece of fruit, ½ cup chopped or canned fruit, 1/4 cup dried fruit, or 4 ounces (½ cup) of fruit juice. Choose fruit more often than fruit juice. · Eat 4 to 5 servings of vegetables each day.  A serving is 1 cup of lettuce or raw leafy vegetables, ½ cup of chopped or cooked vegetables, or 4 ounces (½ cup) of vegetable juice. Choose vegetables more often than vegetable juice. · Get 2 to 3 servings of low-fat and fat-free dairy each day. A serving is 8 ounces of milk, 1 cup of yogurt, or 1 ½ ounces of cheese. · Eat 6 to 8 servings of grains each day. A serving is 1 slice of bread, 1 ounce of dry cereal, or ½ cup of cooked rice, pasta, or cooked cereal. Try to choose whole-grain products as much as possible. · Limit lean meat, poultry, and fish to 2 servings each day. A serving is 3 ounces, about the size of a deck of cards. · Eat 4 to 5 servings of nuts, seeds, and legumes (cooked dried beans, lentils, and split peas) each week. A serving is 1/3 cup of nuts, 2 tablespoons of seeds, or ½ cup of cooked beans or peas. · Limit fats and oils to 2 to 3 servings each day. A serving is 1 teaspoon of vegetable oil or 2 tablespoons of salad dressing. · Limit sweets and added sugars to 5 servings or less a week. A serving is 1 tablespoon jelly or jam, ½ cup sorbet, or 1 cup of lemonade. · Eat less than 2,300 milligrams (mg) of sodium a day. If you limit your sodium to 1,500 mg a day, you can lower your blood pressure even more. Tips for success · Start small. Do not try to make dramatic changes to your diet all at once. You might feel that you are missing out on your favorite foods and then be more likely to not follow the plan. Make small changes, and stick with them. Once those changes become habit, add a few more changes. · Try some of the following: ¨ Make it a goal to eat a fruit or vegetable at every meal and at snacks. This will make it easy to get the recommended amount of fruits and vegetables each day. ¨ Try yogurt topped with fruit and nuts for a snack or healthy dessert. ¨ Add lettuce, tomato, cucumber, and onion to sandwiches.  
¨ Combine a ready-made pizza crust with low-fat mozzarella cheese and lots of vegetable toppings. Try using tomatoes, squash, spinach, broccoli, carrots, cauliflower, and onions. ¨ Have a variety of cut-up vegetables with a low-fat dip as an appetizer instead of chips and dip. ¨ Sprinkle sunflower seeds or chopped almonds over salads. Or try adding chopped walnuts or almonds to cooked vegetables. ¨ Try some vegetarian meals using beans and peas. Add garbanzo or kidney beans to salads. Make burritos and tacos with mashed daigle beans or black beans. Where can you learn more? Go to http://pippaW&W Communicationsvenkatesh.info/. Enter M743 in the search box to learn more about \"DASH Diet: Care Instructions. \" Current as of: April 3, 2017 Content Version: 11.3 © 4536-8460 [employer]. Care instructions adapted under license by [employer] (which disclaims liability or warranty for this information). If you have questions about a medical condition or this instruction, always ask your healthcare professional. [address] any warranty or liability for your use of this information. Learning About Diabetes Food Guidelines Your Care Instructions Meal planning is important to manage diabetes. It helps keep your blood sugar at a target level (which you set with your doctor). You don't have to eat special foods. You can eat what your family eats, including sweets once in a while. But you do have to pay attention to how often you eat and how much you eat of certain foods. You may want to work with a dietitian or a certified diabetes educator (CDE) to help you plan meals and snacks. A dietitian or CDE can also help you lose weight if that is one of your goals. What should you know about eating carbs? Managing the amount of carbohydrate (carbs) you eat is an important part of healthy meals when you have diabetes. Carbohydrate is found in many foods. · Learn which foods have carbs. And learn the amounts of carbs in different foods. ¨ Bread, cereal, pasta, and rice have about 15 grams of carbs in a serving. A serving is 1 slice of bread (1 ounce), ½ cup of cooked cereal, or 1/3 cup of cooked pasta or rice. ¨ Fruits have 15 grams of carbs in a serving. A serving is 1 small fresh fruit, such as an apple or orange; ½ of a banana; ½ cup of cooked or canned fruit; ½ cup of fruit juice; 1 cup of melon or raspberries; or 2 tablespoons of dried fruit. ¨ Milk and no-sugar-added yogurt have 15 grams of carbs in a serving. A serving is 1 cup of milk or 2/3 cup of no-sugar-added yogurt. ¨ Starchy vegetables have 15 grams of carbs in a serving. A serving is ½ cup of mashed potatoes or sweet potato; 1 cup winter squash; ½ of a small baked potato; ½ cup of cooked beans; or ½ cup cooked corn or green peas. · Learn how much carbs to eat each day and at each meal. A dietitian or CDE can teach you how to keep track of the amount of carbs you eat. This is called carbohydrate counting. · If you are not sure how to count carbohydrate grams, use the Plate Method to plan meals. It is a good, quick way to make sure that you have a balanced meal. It also helps you spread carbs throughout the day. ¨ Divide your plate by types of foods. Put non-starchy vegetables on half the plate, meat or other protein food on one-quarter of the plate, and a grain or starchy vegetable in the final quarter of the plate. To this you can add a small piece of fruit and 1 cup of milk or yogurt, depending on how many carbs you are supposed to eat at a meal. 
· Try to eat about the same amount of carbs at each meal. Do not \"save up\" your daily allowance of carbs to eat at one meal. 
· Proteins have very little or no carbs per serving. Examples of proteins are beef, chicken, turkey, fish, eggs, tofu, cheese, cottage cheese, and peanut butter. A serving size of meat is 3 ounces, which is about the size of a deck of cards.  Examples of meat substitute serving sizes (equal to 1 ounce of meat) are 1/4 cup of cottage cheese, 1 egg, 1 tablespoon of peanut butter, and ½ cup of tofu. How can you eat out and still eat healthy? · Learn to estimate the serving sizes of foods that have carbohydrate. If you measure food at home, it will be easier to estimate the amount in a serving of restaurant food. · If the meal you order has too much carbohydrate (such as potatoes, corn, or baked beans), ask to have a low-carbohydrate food instead. Ask for a salad or green vegetables. · If you use insulin, check your blood sugar before and after eating out to help you plan how much to eat in the future. · If you eat more carbohydrate at a meal than you had planned, take a walk or do other exercise. This will help lower your blood sugar. What else should you know? · Limit saturated fat, such as the fat from meat and dairy products. This is a healthy choice because people who have diabetes are at higher risk of heart disease. So choose lean cuts of meat and nonfat or low-fat dairy products. Use olive or canola oil instead of butter or shortening when cooking. · Don't skip meals. Your blood sugar may drop too low if you skip meals and take insulin or certain medicines for diabetes. · Check with your doctor before you drink alcohol. Alcohol can cause your blood sugar to drop too low. Alcohol can also cause a bad reaction if you take certain diabetes medicines. Follow-up care is a key part of your treatment and safety. Be sure to make and go to all appointments, and call your doctor if you are having problems. It's also a good idea to know your test results and keep a list of the medicines you take. Where can you learn more? Go to http://pippa-venkatesh.info/. Enter F511 in the search box to learn more about \"Learning About Diabetes Food Guidelines. \" Current as of: March 13, 2017 Content Version: 11.3 © 5763-1557 [employer].  Care instructions adapted under license by [facility] [address] (which disclaims liability or warranty for this information). If you have questions about a medical condition or this instruction, always ask your healthcare professional. [address] any warranty or liability for your use of this information. Learning About Meal Planning for Diabetes Why plan your meals? Meal planning can be a key part of managing diabetes. Planning meals and snacks with the right balance of carbohydrate, protein, and fat can help you keep your blood sugar at the target level you set with your doctor. You don't have to eat special foods. You can eat what your family eats, including sweets once in a while. But you do have to pay attention to how often you eat and how much you eat of certain foods. You may want to work with a dietitian or a certified diabetes educator. He or she can give you tips and meal ideas and can answer your questions about meal planning. This health professional can also help you reach a healthy weight if that is one of your goals. What plan is right for you? Your dietitian or diabetes educator may suggest that you start with the plate format or carbohydrate counting. The plate format The plate format is a simple way to help you manage how you eat. You plan meals by learning how much space each food should take on a plate. Using the plate format helps you spread carbohydrate throughout the day. It can make it easier to keep your blood sugar level within your target range. It also helps you see if you're eating healthy portion sizes. To use the plate format, you put non-starchy vegetables on half your plate. Add meat or meat substitutes on one-quarter of the plate. Put a grain or starchy vegetable (such as brown rice or a potato) on the final quarter of the plate.  You can add a small piece of fruit and some low-fat or fat-free milk or yogurt, depending on your carbohydrate goal for each meal. 
 Here are some tips for using the plate format: · Make sure that you are not using an oversized plate. A 9-inch plate is best. Many restaurants use larger plates. · Get used to using the plate format at home. Then you can use it when you eat out. · Write down your questions about using the plate format. Talk to your doctor, a dietitian, or a diabetes educator about your concerns. Carbohydrate counting With carbohydrate counting, you plan meals based on the amount of carbohydrate in each food. Carbohydrate raises blood sugar higher and more quickly than any other nutrient. It is found in desserts, breads and cereals, and fruit. It's also found in starchy vegetables such as potatoes and corn, grains such as rice and pasta, and milk and yogurt. Spreading carbohydrate throughout the day helps keep your blood sugar levels within your target range. Your daily amount depends on several things, including your weight, how active you are, which diabetes medicines you take, and what your goals are for your blood sugar levels. A registered dietitian or diabetes educator can help you plan how much carbohydrate to include in each meal and snack. A guideline for your daily amount of carbohydrate is: · 45 to 60 grams at each meal. That's about the same as 3 to 4 carbohydrate servings. · 15 to 20 grams at each snack. That's about the same as 1 carbohydrate serving. The Nutrition Facts label on packaged foods tells you how much carbohydrate is in a serving of the food. First, look at the serving size on the food label. Is that the amount you eat in a serving? All of the nutrition information on a food label is based on that serving size. So if you eat more or less than that, you'll need to adjust the other numbers. Total carbohydrate is the next thing you need to look for on the label. If you count carbohydrate servings, one serving of carbohydrate is 15 grams.  
For foods that don't come with labels, such as fresh fruits and vegetables, you'll need a guide that lists carbohydrate in these foods. Ask your doctor, dietitian, or diabetes educator about books or other nutrition guides you can use. If you take insulin, you need to know how many grams of carbohydrate are in a meal. This lets you know how much rapid-acting insulin to take before you eat. If you use an insulin pump, you get a constant rate of insulin during the day. So the pump must be programmed at meals to give you extra insulin to cover the rise in blood sugar after meals. When you know how much carbohydrate you will eat, you can take the right amount of insulin. Or, if you always use the same amount of insulin, you need to make sure that you eat the same amount of carbohydrate at meals. If you need more help to understand carbohydrate counting and food labels, ask your doctor, dietitian, or diabetes educator. How do you get started with meal planning? Here are some tips to get started: 
· Plan your meals a week at a time. Don't forget to include snacks too. · Use cookbooks or online recipes to plan several main meals. Plan some quick meals for busy nights. You also can double some recipes that freeze well. Then you can save half for other busy nights when you don't have time to cook. · Make sure you have the ingredients you need for your recipes. If you're running low on basic items, put these items on your shopping list too. · List foods that you use to make breakfasts, lunches, and snacks. List plenty of fruits and vegetables. · Post this list on the refrigerator. Add to it as you think of more things you need. · Take the list to the store to do your weekly shopping. Follow-up care is a key part of your treatment and safety. Be sure to make and go to all appointments, and call your doctor if you are having problems. It's also a good idea to know your test results and keep a list of the medicines you take. Where can you learn more? Go to http://pippa-venkatesh.info/. Edgar Bowers in the search box to learn more about \"Learning About Meal Planning for Diabetes. \" Current as of: March 13, 2017 Content Version: 11.3 © 8588-5028 [employer]. Care instructions adapted under license by [employer] (which disclaims liability or warranty for this information). If you have questions about a medical condition or this instruction, always ask your healthcare professional. [address] any warranty or liability for your use of this information. Be sure to check your blood sugar 3 times per day every day. Start Lantus at 10 units every night. Every day that all of your blood sugars are over 200 increase the Lantus by 4 units. When you get any blood sugar readings below 200 hold that dose. Introducing [facility]! Janice Matt introduces [employer]athom Online patient portal. Now you can access parts of your medical record, email your doctor's office, and request medication refills online. 1. In your internet browser, go to [email or web address] 2. Click on the First Time User? Click Here link in the Sign In box. You will see the New Member Sign Up page. 3. Enter your Fathom Online Access Code exactly as it appears below. You will not need to use this code after youve completed the sign-up process. If you do not sign up before the expiration date, you must request a new code. · [employer]athom Online Access Code: [email or web address] Expires: 12/21/2017  9:32 AM 
 
4. Enter the last four digits of your Social Security Number (xxxx) and Date of Birth (mm/dd/yyyy) as indicated and click Submit. You will be taken to the next sign-up page. 5. Create a Fathom Online ID. This will be your Fathom Online login ID and cannot be changed, so think of one that is secure and easy to remember. 6. Create a MindBitest password. You can change your password at any time. 7. Enter your Password Reset Question and Answer. This can be used at a later time if you forget your password. 8. Enter your e-mail address. You will receive e-mail notification when new information is available in [address] 9. Click Sign Up. You can now view and download portions of your medical record. 10. Click the Download Summary menu link to download a portable copy of your medical information. If you have questions, please visit the Frequently Asked Questions section of the SofGenie website. Remember, SofGenie is NOT to be used for urgent needs. For medical emergencies, dial 911. Now available from your iPhone and Android! Please provide this summary of care documentation to your next provider. Your primary care clinician is listed as NADER Mueller. If you have any questions after today's visit, please call [phone].

## 2017-09-22 NOTE — PROGRESS NOTES
1. Have you been to the ER, urgent care clinic since your last visit? Hospitalized since your last visit? No.     2. Have you seen or consulted any other health care providers outside of the [address] [place] [address] since your last visit? Include any pap smears or colon screening. No.     Patient presents with follow up Diabetes, Vertigo, Hyperlipidemia, transiminitis and to discuss lab results.

## 2017-09-22 NOTE — PATIENT INSTRUCTIONS
DASH Diet: Care Instructions  Your Care Instructions  The DASH diet is an eating plan that can help lower your blood pressure. DASH stands for Dietary Approaches to Stop Hypertension. Hypertension is high blood pressure. The DASH diet focuses on eating foods that are high in calcium, potassium, and magnesium. These nutrients can lower blood pressure. The foods that are highest in these nutrients are fruits, vegetables, low-fat dairy products, nuts, seeds, and legumes. But taking calcium, potassium, and magnesium supplements instead of eating foods that are high in those nutrients does not have the same effect. The DASH diet also includes whole grains, fish, and poultry. The DASH diet is one of several lifestyle changes your doctor may recommend to lower your high blood pressure. Your doctor may also want you to decrease the amount of sodium in your diet. Lowering sodium while following the DASH diet can lower blood pressure even further than just the DASH diet alone. Follow-up care is a key part of your treatment and safety. Be sure to make and go to all appointments, and call your doctor if you are having problems. It's also a good idea to know your test results and keep a list of the medicines you take. How can you care for yourself at home? Following the DASH diet  · Eat 4 to 5 servings of fruit each day. A serving is 1 medium-sized piece of fruit, ½ cup chopped or canned fruit, 1/4 cup dried fruit, or 4 ounces (½ cup) of fruit juice. Choose fruit more often than fruit juice. · Eat 4 to 5 servings of vegetables each day. A serving is 1 cup of lettuce or raw leafy vegetables, ½ cup of chopped or cooked vegetables, or 4 ounces (½ cup) of vegetable juice. Choose vegetables more often than vegetable juice. · Get 2 to 3 servings of low-fat and fat-free dairy each day. A serving is 8 ounces of milk, 1 cup of yogurt, or 1 ½ ounces of cheese. · Eat 6 to 8 servings of grains each day.  A serving is 1 slice of bread, 1 ounce of dry cereal, or ½ cup of cooked rice, pasta, or cooked cereal. Try to choose whole-grain products as much as possible. · Limit lean meat, poultry, and fish to 2 servings each day. A serving is 3 ounces, about the size of a deck of cards. · Eat 4 to 5 servings of nuts, seeds, and legumes (cooked dried beans, lentils, and split peas) each week. A serving is 1/3 cup of nuts, 2 tablespoons of seeds, or ½ cup of cooked beans or peas. · Limit fats and oils to 2 to 3 servings each day. A serving is 1 teaspoon of vegetable oil or 2 tablespoons of salad dressing. · Limit sweets and added sugars to 5 servings or less a week. A serving is 1 tablespoon jelly or jam, ½ cup sorbet, or 1 cup of lemonade. · Eat less than 2,300 milligrams (mg) of sodium a day. If you limit your sodium to 1,500 mg a day, you can lower your blood pressure even more. Tips for success  · Start small. Do not try to make dramatic changes to your diet all at once. You might feel that you are missing out on your favorite foods and then be more likely to not follow the plan. Make small changes, and stick with them. Once those changes become habit, add a few more changes. · Try some of the following:  ¨ Make it a goal to eat a fruit or vegetable at every meal and at snacks. This will make it easy to get the recommended amount of fruits and vegetables each day. ¨ Try yogurt topped with fruit and nuts for a snack or healthy dessert. ¨ Add lettuce, tomato, cucumber, and onion to sandwiches. ¨ Combine a ready-made pizza crust with low-fat mozzarella cheese and lots of vegetable toppings. Try using tomatoes, squash, spinach, broccoli, carrots, cauliflower, and onions. ¨ Have a variety of cut-up vegetables with a low-fat dip as an appetizer instead of chips and dip. ¨ Sprinkle sunflower seeds or chopped almonds over salads. Or try adding chopped walnuts or almonds to cooked vegetables. ¨ Try some vegetarian meals using beans and peas. Add garbanzo or kidney beans to salads. Make burritos and tacos with mashed daigle beans or black beans. Where can you learn more? Go to http://pippa-venkatesh.info/. Enter F185 in the search box to learn more about \"DASH Diet: Care Instructions. \"  Current as of: April 3, 2017  Content Version: 11.3  © 9220-9388 [employer]. Care instructions adapted under license by [employer] (which disclaims liability or warranty for this information). If you have questions about a medical condition or this instruction, always ask your healthcare professional. [place] [address] any warranty or liability for your use of this information. Learning About Diabetes Food Guidelines  Your Care Instructions  Meal planning is important to manage diabetes. It helps keep your blood sugar at a target level (which you set with your doctor). You don't have to eat special foods. You can eat what your family eats, including sweets once in a while. But you do have to pay attention to how often you eat and how much you eat of certain foods. You may want to work with a dietitian or a certified diabetes educator (CDE) to help you plan meals and snacks. A dietitian or CDE can also help you lose weight if that is one of your goals. What should you know about eating carbs? Managing the amount of carbohydrate (carbs) you eat is an important part of healthy meals when you have diabetes. Carbohydrate is found in many foods. · Learn which foods have carbs. And learn the amounts of carbs in different foods. ¨ Bread, cereal, pasta, and rice have about 15 grams of carbs in a serving. A serving is 1 slice of bread (1 ounce), ½ cup of cooked cereal, or 1/3 cup of cooked pasta or rice. ¨ Fruits have 15 grams of carbs in a serving.  A serving is 1 small fresh fruit, such as an apple or orange; ½ of a banana; ½ cup of cooked or canned fruit; ½ cup of fruit juice; 1 cup of melon or raspberries; or 2 tablespoons of dried fruit. ¨ Milk and no-sugar-added yogurt have 15 grams of carbs in a serving. A serving is 1 cup of milk or 2/3 cup of no-sugar-added yogurt. ¨ Starchy vegetables have 15 grams of carbs in a serving. A serving is ½ cup of mashed potatoes or sweet potato; 1 cup winter squash; ½ of a small baked potato; ½ cup of cooked beans; or ½ cup cooked corn or green peas. · Learn how much carbs to eat each day and at each meal. A dietitian or CDE can teach you how to keep track of the amount of carbs you eat. This is called carbohydrate counting. · If you are not sure how to count carbohydrate grams, use the Plate Method to plan meals. It is a good, quick way to make sure that you have a balanced meal. It also helps you spread carbs throughout the day. ¨ Divide your plate by types of foods. Put non-starchy vegetables on half the plate, meat or other protein food on one-quarter of the plate, and a grain or starchy vegetable in the final quarter of the plate. To this you can add a small piece of fruit and 1 cup of milk or yogurt, depending on how many carbs you are supposed to eat at a meal.  · Try to eat about the same amount of carbs at each meal. Do not \"save up\" your daily allowance of carbs to eat at one meal.  · Proteins have very little or no carbs per serving. Examples of proteins are beef, chicken, turkey, fish, eggs, tofu, cheese, cottage cheese, and peanut butter. A serving size of meat is 3 ounces, which is about the size of a deck of cards. Examples of meat substitute serving sizes (equal to 1 ounce of meat) are 1/4 cup of cottage cheese, 1 egg, 1 tablespoon of peanut butter, and ½ cup of tofu. How can you eat out and still eat healthy? · Learn to estimate the serving sizes of foods that have carbohydrate. If you measure food at home, it will be easier to estimate the amount in a serving of restaurant food.   · If the meal you order has too much carbohydrate (such as potatoes, corn, or baked beans), ask to have a low-carbohydrate food instead. Ask for a salad or green vegetables. · If you use insulin, check your blood sugar before and after eating out to help you plan how much to eat in the future. · If you eat more carbohydrate at a meal than you had planned, take a walk or do other exercise. This will help lower your blood sugar. What else should you know? · Limit saturated fat, such as the fat from meat and dairy products. This is a healthy choice because people who have diabetes are at higher risk of heart disease. So choose lean cuts of meat and nonfat or low-fat dairy products. Use olive or canola oil instead of butter or shortening when cooking. · Don't skip meals. Your blood sugar may drop too low if you skip meals and take insulin or certain medicines for diabetes. · Check with your doctor before you drink alcohol. Alcohol can cause your blood sugar to drop too low. Alcohol can also cause a bad reaction if you take certain diabetes medicines. Follow-up care is a key part of your treatment and safety. Be sure to make and go to all appointments, and call your doctor if you are having problems. It's also a good idea to know your test results and keep a list of the medicines you take. Where can you learn more? Go to http://pippa-venkatesh.info/. Enter J281 in the search box to learn more about \"Learning About Diabetes Food Guidelines. \"  Current as of: March 13, 2017  Content Version: 11.3  © 8872-3603 [employer]. Care instructions adapted under license by [employer] (which disclaims liability or warranty for this information). If you have questions about a medical condition or this instruction, always ask your healthcare professional. [place] [address] any warranty or liability for your use of this information. Learning About Meal Planning for Diabetes  Why plan your meals?   Meal planning can be a key part of managing diabetes. Planning meals and snacks with the right balance of carbohydrate, protein, and fat can help you keep your blood sugar at the target level you set with your doctor. You don't have to eat special foods. You can eat what your family eats, including sweets once in a while. But you do have to pay attention to how often you eat and how much you eat of certain foods. You may want to work with a dietitian or a certified diabetes educator. He or she can give you tips and meal ideas and can answer your questions about meal planning. This health professional can also help you reach a healthy weight if that is one of your goals. What plan is right for you? Your dietitian or diabetes educator may suggest that you start with the plate format or carbohydrate counting. The plate format  The plate format is a simple way to help you manage how you eat. You plan meals by learning how much space each food should take on a plate. Using the plate format helps you spread carbohydrate throughout the day. It can make it easier to keep your blood sugar level within your target range. It also helps you see if you're eating healthy portion sizes. To use the plate format, you put non-starchy vegetables on half your plate. Add meat or meat substitutes on one-quarter of the plate. Put a grain or starchy vegetable (such as brown rice or a potato) on the final quarter of the plate. You can add a small piece of fruit and some low-fat or fat-free milk or yogurt, depending on your carbohydrate goal for each meal.  Here are some tips for using the plate format:  · Make sure that you are not using an oversized plate. A 9-inch plate is best. Many restaurants use larger plates. · Get used to using the plate format at home. Then you can use it when you eat out. · Write down your questions about using the plate format. Talk to your doctor, a dietitian, or a diabetes educator about your concerns.   Carbohydrate counting  With carbohydrate counting, you plan meals based on the amount of carbohydrate in each food. Carbohydrate raises blood sugar higher and more quickly than any other nutrient. It is found in desserts, breads and cereals, and fruit. It's also found in starchy vegetables such as potatoes and corn, grains such as rice and pasta, and milk and yogurt. Spreading carbohydrate throughout the day helps keep your blood sugar levels within your target range. Your daily amount depends on several things, including your weight, how active you are, which diabetes medicines you take, and what your goals are for your blood sugar levels. A registered dietitian or diabetes educator can help you plan how much carbohydrate to include in each meal and snack. A guideline for your daily amount of carbohydrate is:  · 45 to 60 grams at each meal. That's about the same as 3 to 4 carbohydrate servings. · 15 to 20 grams at each snack. That's about the same as 1 carbohydrate serving. The Nutrition Facts label on packaged foods tells you how much carbohydrate is in a serving of the food. First, look at the serving size on the food label. Is that the amount you eat in a serving? All of the nutrition information on a food label is based on that serving size. So if you eat more or less than that, you'll need to adjust the other numbers. Total carbohydrate is the next thing you need to look for on the label. If you count carbohydrate servings, one serving of carbohydrate is 15 grams. For foods that don't come with labels, such as fresh fruits and vegetables, you'll need a guide that lists carbohydrate in these foods. Ask your doctor, dietitian, or diabetes educator about books or other nutrition guides you can use. If you take insulin, you need to know how many grams of carbohydrate are in a meal. This lets you know how much rapid-acting insulin to take before you eat. If you use an insulin pump, you get a constant rate of insulin during the day.  So the pump must be programmed at meals to give you extra insulin to cover the rise in blood sugar after meals. When you know how much carbohydrate you will eat, you can take the right amount of insulin. Or, if you always use the same amount of insulin, you need to make sure that you eat the same amount of carbohydrate at meals. If you need more help to understand carbohydrate counting and food labels, ask your doctor, dietitian, or diabetes educator. How do you get started with meal planning? Here are some tips to get started:  · Plan your meals a week at a time. Don't forget to include snacks too. · Use cookbooks or online recipes to plan several main meals. Plan some quick meals for busy nights. You also can double some recipes that freeze well. Then you can save half for other busy nights when you don't have time to cook. · Make sure you have the ingredients you need for your recipes. If you're running low on basic items, put these items on your shopping list too. · List foods that you use to make breakfasts, lunches, and snacks. List plenty of fruits and vegetables. · Post this list on the refrigerator. Add to it as you think of more things you need. · Take the list to the store to do your weekly shopping. Follow-up care is a key part of your treatment and safety. Be sure to make and go to all appointments, and call your doctor if you are having problems. It's also a good idea to know your test results and keep a list of the medicines you take. Where can you learn more? Go to http://pippa-venkatesh.info/. Mamta Cummings in the search box to learn more about \"Learning About Meal Planning for Diabetes. \"  Current as of: March 13, 2017  Content Version: 11.3  © 0676-7123 [employer], [employer] Care instructions adapted under license by "[employer] (which disclaims liability or warranty for this information).  If you have questions about a medical condition or this instruction, always ask your healthcare professional. [place] [address] any warranty or liability for your use of this information. Be sure to check your blood sugar 3 times per day every day. Start Lantus at 10 units every night. Every day that all of your blood sugars are over 200 increase the Lantus by 4 units. When you get any blood sugar readings below 200 hold that dose.

## 2017-09-22 NOTE — PROGRESS NOTES
Disease Specific Care Management: DM    Referred by Dr. Ashleigh Lraa for Diabetes Education. A1c 12.4 on 9/12/17. Met with patient, she understand's effects of not controlling her Diabetes, her goal is not to be on Dialysis. Recently had other Health priorities and has not taking care of her Diabetes. 24  Hour diet recall for yesterday, for breakfast @[employer]: medium caramel coffee and croissant with sausage and egg, skipped lunch, chicken, torie slaw with mashed potatoes from [email or web address] fried chicken for supper. 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 Denies snacking. Motivated to re-star her Lantus and states there are many food she does not like. Patient able to teach back plan for Lantus, when asked if her BG tomorrow evening is 250, she stated she would add 4 units and take 14 units, if her BG is less then 200, she take her previous dose. Education: Lantus Quick Reference Guide reviewed and copy given to patient. Discussed what causes blood glucose to increase and decrease, importance or taking medications, eating healthy portions of carbohydrates and exercise.     Plan: Patient was pressed for time,go to work, she was agreeable to working on her diet when I return to the office week of October 1, 2017

## 2017-09-27 ENCOUNTER — OFFICE VISIT (OUTPATIENT)
Dept: OBGYN CLINIC | Age: 57
End: 2017-09-27

## 2017-09-27 VITALS
DIASTOLIC BLOOD PRESSURE: 74 MMHG | BODY MASS INDEX: 45.08 KG/M2 | WEIGHT: 245 LBS | HEIGHT: 62 IN | HEART RATE: 73 BPM | SYSTOLIC BLOOD PRESSURE: 113 MMHG

## 2017-09-27 DIAGNOSIS — Z01.419 ENCOUNTER FOR GYNECOLOGICAL EXAMINATION WITHOUT ABNORMAL FINDING: Primary | ICD-10-CM

## 2017-09-27 NOTE — PROGRESS NOTES
63 y/o  presents to the office to establish GYN care. She was recently seen for her WWE 3 months ago at [facility]. She states at that time she had a pap smear, which was normal. She also states she was treated for a yeast infection and now is asymptomatic. She has no concerns today. She is postmenopausal and denies any bleeding. She isn't sexually active. She is working to improve her DM.    ROS: negative. Active Ambulatory Problems     Diagnosis Date Noted    Uncontrolled type 2 diabetes mellitus without complication, with long-term current use of insulin ([facility] [address] 2017    History of left breast cancer 2017    Mixed hyperlipidemia 2017    Vitamin D deficiency 2017    Transaminitis 2017     Resolved Ambulatory Problems     Diagnosis Date Noted    No Resolved Ambulatory Problems     Past Medical History:   Diagnosis Date    Breast cancer ([facility] [address]     Cancer ([facility] [address]     Diabetes ([facility] [address]     Hypercholesterolemia     Ulcerative colitis ([facility] [address]        Visit Vitals    /74    Pulse 73    Ht 5' 2\" (1.575 m)    Wt 245 lb (111.1 kg)    BMI 44.81 kg/m2       She is a 2 year breast cancer survivor. Medical oncologist: Estrella Gonzalez  Breast Surgeon: Mara Nicolas    A/P:  63 y/o with breast cancer in remission. Recent GYN exam and pap smear normal.  Record release done. RTO in 2 years for annual exam or prn. Discussed importance of BS control and effect uncontrolled DM has on vaginitis. The plan of care has been discussed with the patient. She has been given the opportunity to ask questions, which seem to have been answered satisfactorily.

## 2017-09-27 NOTE — PATIENT INSTRUCTIONS
Home Blood Glucose Test: About This Test  What is it? A home blood glucose test measures the amount of a type of sugar, called glucose, in your blood. Why is this test done? People who have diabetes need to check the amount of glucose in their blood. A home blood glucose test is an easy way to test your blood at home or when you are away from home. The results help you know when to take action to keep your blood glucose levels in a target range. How can you prepare for the test?  · Check the expiration date on the bottle of testing strips. Do not use test strips that have . · Match the code number on the testing strips bottle with the number on the meter. If the numbers do not match, follow the directions with the meter for changing the code number. What happens before the test?  The supplies you will need for testing blood glucose include:  · A blood glucose meter. · Testing strips. These are made to be used with a specific model of meter. · Sugar control solutions. Some meters require a specific solution. Many new meters are made to operate without a control solution. · Short needles called lancets for pricking your skin. · A pen-sized mclain for the lancet (lancet device), which positions the lancet and controls how deeply it goes into your skin. · Clean cotton balls. These are used to stop the bleeding from the testing site. What happens during the test?  A home blood glucose test involves pricking your finger, palm, or forearm with a lancet to collect a drop of blood. The blood drop is placed on a test strip, which you insert into the blood glucose meter. The instructions for testing are slightly different for each blood glucose meter model. Follow the instructions that came with your meter. · Wash your hands with warm, soapy water. Dry them well with a clean towel.  You may also use an alcohol wipe to clean your finger or other site, but make sure your hands are dry before the test.  · Insert a clean lancet into the lancet device. · Remove a test strip from the test strip bottle. Replace the lid immediately to keep moisture away from the other strips. · Follow the instructions that came with your meter to get it ready. · Use the lancet device to stick the side of your fingertip with the lancet. Do not stick the tip of your finger. Some blood sugar meters use lancet devices that take the blood sample from other sites, such as the palm of the hand or the forearm. But the finger is usually the most accurate place to test blood sugar. · Put a drop of blood on the correct spot on the test strip. · Apply pressure with a clean cotton ball to stop the bleeding. · Follow the directions that came with the meter to get the results. · Write down the results and the time that you tested your blood. Some meters will store the results for you. What else should you know about the test?  The American Diabetes Association (ADA) recommends that you stay within the following blood glucose level ranges. But depending on your health, you and your doctor may set a different range for you. For nonpregnant adults with diabetes:  · 80 milligrams per deciliter (mg/dL) to 130 mg/dL before a meal  · Less than 180 mg/dL 1 to 2 hours after a meal  For women who have diabetes related to pregnancy (gestational diabetes):  · 95 mg/dL or less before breakfast  · 120 to 140 mg/dL (or lower) 1 to 2 hours after a meal  How long does the test take? · The blood glucose meter will show the results of the test in a minute or less. Where can you learn more? Go to http://pippa-venkatesh.info/. Enter Y688 in the search box to learn more about \"Home Blood Glucose Test: About This Test.\"  Current as of: March 13, 2017  Content Version: 11.3  © 5253-7668 [employer], [employer]. Care instructions adapted under license by [employer] (which disclaims liability or warranty for this information). If you have questions about a medical condition or this instruction, always ask your healthcare professional. [place] [address] any warranty or liability for your use of this information.

## 2017-10-06 ENCOUNTER — PATIENT OUTREACH (OUTPATIENT)
Dept: FAMILY MEDICINE CLINIC | Age: 57
End: 2017-10-06

## 2017-10-10 ENCOUNTER — APPOINTMENT (OUTPATIENT)
Dept: NUTRITION | Age: 57
End: 2017-10-10

## 2017-10-12 ENCOUNTER — OFFICE VISIT (OUTPATIENT)
Dept: FAMILY MEDICINE CLINIC | Age: 57
End: 2017-10-12

## 2017-10-12 VITALS
TEMPERATURE: 97.4 F | BODY MASS INDEX: 44.31 KG/M2 | HEIGHT: 62 IN | DIASTOLIC BLOOD PRESSURE: 73 MMHG | SYSTOLIC BLOOD PRESSURE: 118 MMHG | WEIGHT: 240.8 LBS | RESPIRATION RATE: 16 BRPM | HEART RATE: 68 BPM

## 2017-10-12 DIAGNOSIS — E55.9 VITAMIN D DEFICIENCY: ICD-10-CM

## 2017-10-12 DIAGNOSIS — K75.81 NASH (NONALCOHOLIC STEATOHEPATITIS): Primary | ICD-10-CM

## 2017-10-12 DIAGNOSIS — K75.81 NASH (NONALCOHOLIC STEATOHEPATITIS): ICD-10-CM

## 2017-10-12 NOTE — PATIENT INSTRUCTIONS
Please increase your Lantus dose by 1 unit every 3 days that none of your blood sugars are under 120. If you have any readings under 120 hold that dose. If you have any readings below 70 decrease the dose by 1-2 units and follow your blood sugar closely. Call us for any questions. Diabetes Sick-Day Plan: Care Instructions  Your Care Instructions  If you have diabetes, many other illnesses can make your blood sugar go up. This can be dangerous. When you are sick with the flu or another illness, your body releases hormones to fight infection. These hormones raise blood sugar levels. They also make it hard for insulin or other medicines to lower your blood sugar. Work with your doctor to make a plan for what to do on days when you are sick. Follow-up care is a key part of your treatment and safety. Be sure to make and go to all appointments, and call your doctor if you are having problems. It's also a good idea to know your test results and keep a list of the medicines you take. How can you care for yourself at home? · Work with your doctor to write up a sick-day plan for what to do on days when you are sick. Your blood sugar can go up or down, depending on your illness and whether you can keep food down. Call your doctor when you are sick. Ask if you need to adjust your pills or insulin. · Write down the diabetes medicines you have been taking and whether you have changed the dose based on your sick-day plan. Have this information ready when you call your doctor. · Eat your normal types and amounts of food. Drink extra fluids, such as water, broth, and fruit juice, to prevent dehydration. ¨ If your blood sugar level is higher than the blood sugar level your doctor recommends (for example, above 240 milligrams per deciliter [mg/dL]), drink extra liquids that do not contain sugar. Examples are water and sugar-free cola.   ¨ If you can't eat your usual foods, drink extra liquids, such as soup, sports drinks, or milk. You may also eat food that is gentle on the stomach. These foods include crackers, gelatin dessert, and applesauce. Try to eat or drink 50 grams of carbohydrates every 3 to 4 hours. For example, 6 saltine crackers, 1 cup (8 ounces) of milk, and ½ cup (4 ounces) of orange juice each contain about 15 grams of carbohydrate. · Check your blood sugar at least every 3 to 4 hours. If it goes up fast, check it more often. And check it even through the night. Take insulin if your doctor told you to do so. If you and your doctor did not have a sick-day plan for taking extra insulin, call him or her for advice. · If you take insulin, check your urine or blood for ketones. This is even more important if your blood sugar is high. · Do not take any over-the-counter medicines, such as pain relievers, decongestants, or herbal products or other natural medicines, without talking with your doctor first.  · Do not drive. If you need to see your doctor or go anywhere else, ask a family member or friend to drive you. When should you call for help? Call 911 anytime you think you may need emergency care. For example, call if:  · You passed out (lost consciousness). · You are confused or cannot think clearly. · Your blood sugar is very high or very low. Watch closely for changes in your health, and be sure to contact your doctor if:  · Your blood sugar stays outside the level your doctor set for you. · You have any problems. Where can you learn more? Go to http://pippa-venkatesh.info/. Enter Q534 in the search box to learn more about \"Diabetes Sick-Day Plan: Care Instructions. \"  Current as of: March 13, 2017  Content Version: 11.3  © 8462-4111 [employer]. Care instructions adapted under license by [employer] (which disclaims liability or warranty for this information).  If you have questions about a medical condition or this instruction, always ask your healthcare professional. [employer], [facility] disclaims any warranty or liability for your use of this information. Hypoglycemia: Care Instructions  Your Care Instructions  Hypoglycemia means that your blood sugar is low and your body is not getting enough fuel. Some people get low blood sugar from not eating often enough. Some medicines to treat diabetes can cause low blood sugar. People who have had surgery on their stomachs or intestines may get hypoglycemia. Problems with the pancreas, kidneys, or liver also can cause low blood sugar. A snack or drink with sugar in it will raise your blood sugar and should ease your symptoms right away. Your doctor may recommend that you change or stop your medicines until you can get your blood sugar levels under control. In the long run, you may need to change your diet and eating habits so that you get enough fuel for your body throughout the day. Follow-up care is a key part of your treatment and safety. Be sure to make and go to all appointments, and call your doctor if you are having problems. It's also a good idea to know your test results and keep a list of the medicines you take. How can you care for yourself at home? · Learn to recognize the early signs of low blood sugar. Signs include:  ¨ Nausea. ¨ Hunger. ¨ Feeling nervous, irritable, or shaky. ¨ Cold, clammy, wet skin. ¨ Sweating (when you are not exercising). ¨ A fast heartbeat. ¨ Numbness or tingling of the fingertips or lips. · If you feel an episode of low blood sugar coming on, drink fruit juice or sugared (not diet) soda, or eat sugar in the form of candy, cubes, or tablets. [employer] are another [employer]. · Eat small, frequent meals so that you do not get too hungry between meals. · Balance extra exercise with eating more. · Keep a written record of your low blood sugar episodes, including when you last ate and what you ate, so that you can learn what causes your blood sugar to drop.   · Make sure your family, friends, and coworkers know the symptoms of low blood sugar and know what to do to get your sugar level up. · Wear medical alert jewelry that lists your condition. You can buy this at most drugstores. When should you call for help? Call 911 anytime you think you may need emergency care. For example, call if:  · You passed out (lost consciousness). · You are confused or cannot think clearly. · Your blood sugar is very high or very low. Watch closely for changes in your health, and be sure to contact your doctor if:  · Your blood sugar stays outside the level your doctor set for you. · You have any problems. Where can you learn more? Go to http://pippa-venkatesh.info/. Enter I863 in the search box to learn more about \"Hypoglycemia: Care Instructions. \"  Current as of: March 13, 2017  Content Version: 11.3  © 5978-1673 [employer] ([employer]). Care instructions adapted under license by [employer] (which disclaims liability or warranty for this information). If you have questions about a medical condition or this instruction, always ask your healthcare professional. [address] any warranty or liability for your use of this information.

## 2017-10-12 NOTE — MR AVS SNAPSHOT
Visit Information Date & Time Provider Department Dept. Phone Encounter #  
 10/12/2017  8:45 AM Lazaro Bell, [address] [facility] [phone] [id number] Follow-up Instructions Return in about 2 months (around 12/12/2017) for lab and f/u in mid December. [facility] Upcoming Health Maintenance Date Due FOBT Q 1 YEAR AGE 50-75 7/8/2010 Pneumococcal 19-64 Medium Risk (1 of 1 - PPSV23) 11/14/2017* DTaP/Tdap/Td series (1 - Tdap) 11/14/2017* EYE EXAM RETINAL OR DILATED Q1 11/21/2017* HEMOGLOBIN A1C Q6M 3/11/2018 MICROALBUMIN Q1 9/11/2018 LIPID PANEL Q1 9/11/2018 FOOT EXAM Q1 9/29/2018 BREAST CANCER SCRN MAMMOGRAM 6/16/2019 PAP AKA CERVICAL CYTOLOGY 6/1/2020 *Topic was postponed. The date shown is not the original due date. Allergies as of 10/12/2017  Review Complete On: 10/12/2017 By: Lazaro Bell MD  
 No Known Allergies Current Immunizations  Reviewed on 9/11/2017 Name Date Influenza Vaccine (Quad) PF 9/11/2017 Not reviewed this visit You Were Diagnosed With   
  
 Codes Comments MEDINA (nonalcoholic steatohepatitis)    -  Primary ICD-10-CM: Y86.00 ICD-9-CM: 571.8 had u/s by history Vitamin D deficiency     ICD-10-CM: E55.9 ICD-9-CM: 268.9 Uncontrolled type 2 diabetes mellitus without complication, with long-term current use of insulin (HCC)     ICD-10-CM: E11.65, Z79.4 ICD-9-CM: 250.02, V58.67 Vitals BP Pulse Temp Resp Height(growth percentile) Weight(growth percentile) 118/73 68 97.4 °F (36.3 °C) (Oral) 16 5' 2\" (1.575 m) 240 lb 12.8 oz (109.2 kg) BMI OB Status Smoking Status 44.04 kg/m2 Menopause Never Smoker BMI and BSA Data Body Mass Index Body Surface Area 44.04 kg/m 2 2.19 m 2 Preferred Pharmacy Pharmacy Name Phone [id number], [address] [phone] Your Updated Medication List  
  
   
 This list is accurate as of: 10/12/17  9:11 AM.  Always use your most recent med list.  
  
  
  
  
 atorvastatin 40 mg tablet Commonly known as:  LIPITOR Take 1 Tab by mouth daily. Cholecalciferol (Vitamin D3) 50,000 unit Cap Take 1 Cap by mouth every seven (7) days. glipiZIDE 10 mg tablet Commonly known as:  Barb Rankin 10 mg daily. insulin glargine 100 unit/mL (3 mL) Inpn Commonly known as:  LANTUS SOLOSTAR  
10 Units by SubCUTAneous route nightly. Insulin Needles (Disposable) 30 gauge x 1/3\" Use to inject lantus daily sq.  
  
 lisinopril 5 mg tablet Commonly known as:  Narcisa November Take  by mouth daily. metFORMIN  mg tablet Commonly known as:  GLUCOPHAGE XR Take 2 Tabs by mouth daily (with dinner). Follow-up Instructions Return in about 2 months (around 12/12/2017) for lab and f/u in mid December. Seth Velasquez To-Do List   
 10/12/2017 Lab:  HEMOGLOBIN A1C WITH EAG   
  
 10/12/2017 Lab:  METABOLIC PANEL, COMPREHENSIVE   
  
 10/12/2017 Lab:  VITAMIN D, 25 HYDROXY   
  
 10/18/2017 4:00 PM  
  Appointment with Bart Murguia at [facility] ([phone]) Patient Instructions Please increase your Lantus dose by 1 unit every 3 days that none of your blood sugars are under 120. If you have any readings under 120 hold that dose. If you have any readings below 70 decrease the dose by 1-2 units and follow your blood sugar closely. Call us for any questions. Diabetes Sick-Day Plan: Care Instructions Your Care Instructions If you have diabetes, many other illnesses can make your blood sugar go up. This can be dangerous. When you are sick with the flu or another illness, your body releases hormones to fight infection. These hormones raise blood sugar levels. They also make it hard for insulin or other medicines to lower your blood sugar. Work with your doctor to make a plan for what to do on days when you are sick. Follow-up care is a key part of your treatment and safety. Be sure to make and go to all appointments, and call your doctor if you are having problems. It's also a good idea to know your test results and keep a list of the medicines you take. How can you care for yourself at home? · Work with your doctor to write up a sick-day plan for what to do on days when you are sick. Your blood sugar can go up or down, depending on your illness and whether you can keep food down. Call your doctor when you are sick. Ask if you need to adjust your pills or insulin. · Write down the diabetes medicines you have been taking and whether you have changed the dose based on your sick-day plan. Have this information ready when you call your doctor. · Eat your normal types and amounts of food. Drink extra fluids, such as water, broth, and fruit juice, to prevent dehydration. ¨ If your blood sugar level is higher than the blood sugar level your doctor recommends (for example, above 240 milligrams per deciliter [mg/dL]), drink extra liquids that do not contain sugar. Examples are water and sugar-free cola. ¨ If you can't eat your usual foods, drink extra liquids, such as soup, sports drinks, or milk. You may also eat food that is gentle on the stomach. These foods include crackers, gelatin dessert, and applesauce. Try to eat or drink 50 grams of carbohydrates every 3 to 4 hours. For example, 6 saltine crackers, 1 cup (8 ounces) of milk, and ½ cup (4 ounces) of orange juice each contain about 15 grams of carbohydrate. · Check your blood sugar at least every 3 to 4 hours. If it goes up fast, check it more often. And check it even through the night. Take insulin if your doctor told you to do so. If you and your doctor did not have a sick-day plan for taking extra insulin, call him or her for advice. · If you take insulin, check your urine or blood for ketones. This is even more important if your blood sugar is high. · Do not take any over-the-counter medicines, such as pain relievers, decongestants, or herbal products or other natural medicines, without talking with your doctor first. 
· Do not drive. If you need to see your doctor or go anywhere else, ask a family member or friend to drive you. When should you call for help? Call 911 anytime you think you may need emergency care. For example, call if: 
· You passed out (lost consciousness). · You are confused or cannot think clearly. · Your blood sugar is very high or very low. Watch closely for changes in your health, and be sure to contact your doctor if: 
· Your blood sugar stays outside the level your doctor set for you. · You have any problems. Where can you learn more? Go to http://pippa-venkatesh.info/. Enter Y065 in the search box to learn more about \"Diabetes Sick-Day Plan: Care Instructions. \" Current as of: March 13, 2017 Content Version: 11.3 © 6376-7880 [employer]. Care instructions adapted under license by [employer] (which disclaims liability or warranty for this information). If you have questions about a medical condition or this instruction, always ask your healthcare professional. [address] any warranty or liability for your use of this information. Introducing [facility]! Joe Lucas introduces The Moment patient portal. Now you can access parts of your medical record, email your doctor's office, and request medication refills online. 1. In your internet browser, go to [email or web address] 2. Click on the First Time User? Click Here link in the Sign In box. You will see the New Member Sign Up page. 3. Enter your The Moment Access Code exactly as it appears below.  You will not need to use this code after youve completed the sign-up process. If you do not sign up before the expiration date, you must request a new code. · [employer] Access Code: [email or web address] Expires: 12/21/2017  9:32 AM 
 
4. Enter the last four digits of your Social Security Number (xxxx) and Date of Birth (mm/dd/yyyy) as indicated and click Submit. You will be taken to the next sign-up page. 5. Create a [employer] ID. This will be your [employer] login ID and cannot be changed, so think of one that is secure and easy to remember. 6. Create a [employer] password. You can change your password at any time. 7. Enter your Password Reset Question and Answer. This can be used at a later time if you forget your password. 8. Enter your e-mail address. You will receive e-mail notification when new information is available in [address] 9. Click Sign Up. You can now view and download portions of your medical record. 10. Click the Download Summary menu link to download a portable copy of your medical information. If you have questions, please visit the Frequently Asked Questions section of the [employer] website. Remember, [employer] is NOT to be used for urgent needs. For medical emergencies, dial 911. Now available from your iPhone and Android! Please provide this summary of care documentation to your next provider. Your primary care clinician is listed as NADER Mueller. If you have any questions after today's visit, please call [phone].

## 2017-10-12 NOTE — PROGRESS NOTES
Sean Acevedo is a 62 y.o. female and presents with Follow Up Chronic Condition and Diabetes       Subjective:    Diabetes type 2- uncontrolled. No home blood sugars checked. Denies polyuria or polydipsia. Not using lantus.    Vitamin D deficiencyno bone pain  Steatohepatitisno abdominal pain or jaundice  Assessment/Plan:    Diabetes type 2- uncontrolled- discussed diet/wt loss/exercise in detail. start lantus. 10 units. Increase regularly discussed to get bs under 200. Hypoglycemia sx and risks also discussed. Long term complications and increased risk of these with uncontrolled DM also discussed. Orders Placed This Encounter    HEMOGLOBIN A1C WITH EAG     Standing Status:   Future     Number of Occurrences:   1     Standing Expiration Date:   10/13/2018    VITAMIN D, 25 HYDROXY     Standing Status:   Future     Number of Occurrences:   1     Standing Expiration Date:   04/45/6355    METABOLIC PANEL, COMPREHENSIVE     Standing Status:   Future     Number of Occurrences:   1     Standing Expiration Date:   10/13/2018   Diagnoses and all orders for this visit:    1. MEDINA (nonalcoholic steatohepatitis)  Comments:  had u/s by history  Orders:  -     METABOLIC PANEL, COMPREHENSIVE; Future    2. Vitamin D deficiency  -     VITAMIN D, 25 HYDROXY; Future  -     METABOLIC PANEL, COMPREHENSIVE; Future    3. Uncontrolled type 2 diabetes mellitus without complication, with long-term current use of insulin (HCC)  -     HEMOGLOBIN A1C WITH EAG; Future  -     METABOLIC PANEL, COMPREHENSIVE; Future              ROS:  Negative except as mentioned above  Cardiac- no chest pain or palpitations  Pulmonary- no sob or wheezes  GI- no n/v or diarrhea.       SH:  Social History   Substance Use Topics    Smoking status: Never Smoker    Smokeless tobacco: Never Used    Alcohol use Yes      Comment: social         Medications/Allergies:  Current Outpatient Prescriptions on File Prior to Visit   Medication Sig Dispense Refill    insulin glargine (LANTUS SOLOSTAR) 100 unit/mL (3 mL) inpn 10 Units by SubCUTAneous route nightly. (Patient taking differently: 18-20 Units by SubCUTAneous route nightly.) 15 mL 3    Insulin Needles, Disposable, 30 gauge x 1/3\" Use to inject lantus daily sq. 100 Pen Needle 11    atorvastatin (LIPITOR) 40 mg tablet Take 1 Tab by mouth daily. 90 Tab 3    Cholecalciferol, Vitamin D3, 50,000 unit cap Take 1 Cap by mouth every seven (7) days. 12 Cap 0    metFORMIN ER (GLUCOPHAGE XR) 500 mg tablet Take 2 Tabs by mouth daily (with dinner). 60 Tab 2    lisinopril (PRINIVIL, ZESTRIL) 5 mg tablet Take  by mouth daily.  glipiZIDE (GLUCOTROL) 10 mg tablet 10 mg daily. No current facility-administered medications on file prior to visit. No Known Allergies    Objective:  Visit Vitals    /73    Pulse 68    Temp 97.4 °F (36.3 °C) (Oral)    Resp 16    Ht 5' 2\" (1.575 m)    Wt 240 lb 12.8 oz (109.2 kg)    BMI 44.04 kg/m2    Body mass index is 44.04 kg/(m^2). Constitutional: Well developed, nourished, no distress, alert   CV: S1, S2.  RRR. No murmurs/rubs. No thrills palpated. No carotid bruits. Intact distal pulses. No edema. Pulm: No abnormalities on inspection. Clear to auscultation bilaterally. No wheezing/rhonchi. Normal effort. GI: Soft, nontender, nondistended. Normal active bowel sounds. No  masses on palpation. No hepatosplenomegaly.

## 2017-10-12 NOTE — PROGRESS NOTES
1. Have you been to the ER, urgent care clinic since your last visit? Hospitalized since your last visit? No.     2. Have you seen or consulted any other health care providers outside of the [address] [place] [address] since your last visit? Include any pap smears or colon screening. Yes, went to see her gyn on 9/27/2017.      Chief Complaint   Patient presents with    Follow Up Chronic Condition    Diabetes

## 2017-10-13 ENCOUNTER — PATIENT OUTREACH (OUTPATIENT)
Dept: FAMILY MEDICINE CLINIC | Age: 57
End: 2017-10-13

## 2017-10-18 ENCOUNTER — HOSPITAL ENCOUNTER (OUTPATIENT)
Dept: NUTRITION | Age: 57
Discharge: HOME | End: 2017-10-18
Payer: MEDICARE

## 2017-10-18 PROCEDURE — 97802 MEDICAL NUTRITION INDIV IN: CPT

## 2017-10-18 NOTE — PROGRESS NOTES
[address] [place] [address] 51, 45 [facility] [place], 70 [facility]  Phone: [phone]  Fax: [phone]   Nutrition Assessment - Medical Nutrition Therapy   Outpatient Initial Evaluation         Patient Name: Barbie Forbes : 1960   Treatment Diagnosis: Type 2 diabetes, hyperlipidemia   Referral Source: Arabella Santizo MD Start of [facility]): 10/18/2017     Gender: female Age: 62 y.o. Ht: 62 In Wt: 240 lb   BMI: 40 BMR   Male  BMR Female 1630   Anthropometrics Assessment: Per BMI, pt is considered morbidly obese. Moderate abdominal adiposity is evident based on visual observation     Past Medical History includes: Breast cancer, ulcerative colitis (per pt)     Pertinent Medications:   Lantus 22 units/day, quinapril, metformin, atorvastatin, glipizide   Biochemical Data:   Lab Results   Component Value Date/Time    Hemoglobin A1c 12.4 2017 11:03 AM     Lab Results   Component Value Date/Time    Cholesterol, total 176 2017 11:03 AM    HDL Cholesterol 51 2017 11:03 AM    LDL, calculated 104 2017 11:03 AM    VLDL, calculated 22 2017 11:03 AM    Triglyceride 109 2017 11:03 AM     Lab Results   Component Value Date/Time    ALT (SGPT) 41 2017 11:03 AM    AST (SGOT) 26 2017 11:03 AM    Alk. phosphatase 99 2017 11:03 AM    Bilirubin, total 0.4 2017 11:03 AM        Subjective/Assessment:   Pt has had diabetes for several years (cannot remember when she was diagnosed). She has not met with a RD 1:1, but has some previous education several years ago. She lives with her adult daughter and works for [employer] at [employer] in [place], which is fairly active. She does not exercise at all. Pt usually does the grocery shopping and cooking for them. A1c is very high and consistent with reported dietary intake. Pt checks her fasting BG usually every morning and it has been as low as 150 and as high as over 300. Current Eating Patterns: Per diet recall, pt consumes an excessive amount of calorie-dense foods that are high in sugar. For example, b-fast yesterday was a croissant sandwich with egg & 2 slices guevara, 1 glazed donut and a glass of diluted orange juice with \"a lot\" of brown sugar added. She can go all day without eating a meal (11 hours), but occasionally snacks on fruit. [place] Alert may include a protein, starch and vegetable, such as Chinese garlic sauce chicken & broccoli with ~1 cup white rice. She also reports that she drinks about 12-16 oz of Coke each day. Estimate Nutrition Needs   Calories:  1600 Protein: 100 Carbs: 180 Fat: 53   Kcal/day  g/day  g/day  g/day        percent: 25  45  30               Education & Recommendations provided: Educated pt on the pathophysiology of Type II Diabetes and insulin resistance and the rationale for dietary modification and increased activity. Educated pt on carbohydrate food sources, the importance of keeping carb amount consistent throughout the day and limiting the serving size. Also discussed the health benefits of dietary fiber and food sources. Explained the importance of combining carbohydrates with protein at meals. Encouraged pt to avoid drinking her calories through sweetened beverages. Provided areas where pt could improve and she chose to focus on fiber intake.    Handouts Provided: [x]  Carbohydrates  []  Protein  [x]  Fiber  []  Healthy Plate Method  []  Meal and Snack Ideas  []  Food Journals [x]  Diabetes  []  Facts about fats  []  List of non-starchy vegetables  []  Gen Nutr Guidelines  []  SBGM Guidelines  []  Others:   Information Reviewed with: pt   Readiness to Change Stage: []  Pre-contemplative    []  Contemplative  [x]  Preparation               []  Action                  []  Maintenance   Potential Barriers to Learning: []  Decline in memory    []  Language barrier   []  Other:  []  Emotional                  []  Limited mobility      [x] None  []  Lack of motivation     [] Vision, hearing or cognitive impairment   Expected Compliance: Good due to high motivation     Nutritional Goal - To promote lifestyle changes to result in:    [x]  Weight loss  [x]  Improved diabetic control  []  Decreased cholesterol levels  []  Decreased blood pressure  []  Weight maintenance []  Preventing any interactions associated with food allergies  []  Adequate weight gain toward goal weight  []  Other:        Patient Goals: - Pt will increase fiber intake slowly to 21-25 g per day.         Dietitian Signature: Yoel Gallegos RD Date: 10/18/2017   Follow-up: Wed, 11/8/17 @4:30pm Time: 6:32 PM

## 2017-10-25 ENCOUNTER — TELEPHONE (OUTPATIENT)
Dept: FAMILY MEDICINE CLINIC | Age: 57
End: 2017-10-25

## 2017-10-25 NOTE — TELEPHONE ENCOUNTER
Free Style Lite  Glucose Monitoring Strips, patient calling and completely out to these strips  They are not listed in her prescriptions. Patient is requesting a script to be written for the strips. Please call patient back [phone].

## 2017-10-25 NOTE — TELEPHONE ENCOUNTER
Last appt was 10-12-17  Next appt is 12-22-17          Free Style Lite  Glucose Monitoring Strips, patient calling and completely out to these strips  They are not listed in her prescriptions. Patient is requesting a script to be written for the strips.   Please call patient back [phone].

## 2017-11-15 ENCOUNTER — HOSPITAL ENCOUNTER (OUTPATIENT)
Dept: NUTRITION | Age: 57
Discharge: HOME | End: 2017-11-15
Payer: MEDICARE

## 2017-11-15 PROCEDURE — G0109 DIAB MANAGE TRN IND/GROUP: HCPCS

## 2017-11-16 NOTE — PROGRESS NOTES
[facility]      Dsme RECORD and PROGRESS NOTE         Patient[de-identified] Jony Florentino : 1960         Physician: Solitario Bashir MD Date: 11/15/2017     VISIT No: [x]1   []2   []3   []4   []5   []6               [x] Initial         [] Subsequent Year ACTUAL PROGRESS NOTE    A1C: 12.4  Date A1C drawn: 17       [x] Initial     [] Updated  BMI: 44  Height: 62  Weight: 240       [x] Initial     [] Updated                   Weight:[] Up    [] Down  lbs (Only if follow up visit)     Diabetes Meds:  [] None   [x] List: (Provide list at initial visit -only list changes at follow up visits)  Metformin, Lantus (admits to not always taking), Glypizide (? In history but pt did not list)  [] No change       Other Meds:  [] BP   [x] lipids          [] depression/anxiety        [] other:        *Behavior Goals Set Related To: (List at initial visit only and add any new goals, if any, from follow up visits.)  [] *Healthy eating     [] *Being active  [x] *Taking meds       [] *Reducing risks  [] *Monitoring           [] *Healthy coping  [] *Problem-solving  []  Other:  [x] Separate Behavior Form Attached (use to assess progress on previously set goals, as well)        Notes: Pt attended Class I of a 3 class DSME series. Was attentive and showed moderate motivation.                 [] PHYSICIAN ACTION REQUESTED    [] See Addendum   [x] Program schedule and visit dates            Hours furnished:      [] 0.5     [] 1    [] 1.5    [] 2     [] 2.5     [x] 3    Format:   [] Individual       [x] Group    Also present:   []  Family:     [] Friend:     [] Caregiver:        [] Other:                  [x] Individual assessment              [x] Individual education plan and needs     [x] BG goals and targets        [x] Diabetes outcomes (clinical goals)      [x] Behavior goals:               [x] set goals  [] review achievement      [x] A1c test, meaning and goal     [] SMBG* [] SMBG schedule     [] BG meter receipt     Type/name of:                                    [] BG meter review     [x] Diabetes biology                                   [x] What increases/decreases BG           [] Diabetes identification (on body, in wallet/purse)     [x] Healthy eating*                            [x] Body weight: [x] healthy wt             [] risks when overwt     [] Food + BG logs:  [] set goals              [] reviewed logs     [] Pattern management with pts logs         [] Taking meds*         [] OTC meds                [] Sick day care       [x] Being active*          [] Pattern management      [] Reducing risks: hypo & hyperglycemia       [] Reducing risks*: chronic complications     [] Healthy coping & stress management*       [] Foot care       [] Obstacles to behavior change      [] Behavior goals achievement      [] Other diabetes outcomes               [] Reducing risks*: DKA + NKHHS                [] Problem solving*        [] Foot care       [] DSM support plan      [] Pattern management     [] DSME follow-up in subsequent years      []  Monitor health status:             []  eye exam    []  Lipids            [] BP            [] urine microalbumin             [] GFR              [] other:        [x]  Review initial assessment; correct knowledge deficits      []  Patient--selected topics:          []  Other topic(s):           Educator Signature: Lilo Bertrand RD, 11/15/2017 7:10 PM

## 2017-11-29 ENCOUNTER — HOSPITAL ENCOUNTER (OUTPATIENT)
Dept: NUTRITION | Age: 57
Discharge: HOME | End: 2017-11-29
Payer: MEDICARE

## 2017-11-29 PROCEDURE — G0109 DIAB MANAGE TRN IND/GROUP: HCPCS

## 2017-11-30 NOTE — PROGRESS NOTES
[facility] Outpatient Diabetes Self-Management Education Program      Dsme RECORD and PROGRESS NOTE         Patient[de-identified] Cynthia To : 1960         Physician: Tanmay Lynn MD Date: 2017     VISIT No: []1   [x]2   []3   []4   []5   []6               [x] Initial         [] Subsequent Year ACTUAL PROGRESS NOTE    A1C:  Date A1C drawn:        [] Initial     [] Updated  BMI:   Height:   Weight:        [] Initial     [] Updated                   Weight:   [] Up    [] Down  lbs (Only if follow up visit)     Diabetes Meds:  [] None   [] List: (Provide list at initial visit -only list changes at follow up visits)    [x] No change       Other Meds:  [] BP   [] lipids          [] depression/anxiety        [] other:        *Behavior Goals Set Related To: (List at initial visit only and add any new goals, if any, from follow up visits.)  [x] *Healthy eating     [] *Being active  [x] *Taking meds       [] *Reducing risks  [] *Monitoring           [] *Healthy coping  [] *Problem-solving  []  Other:  [x] Separate Behavior Form Attached (use to assess progress on previously set goals, as well)        Notes: Pt attended Class II of a 3 class DSME series. Has improved with her medication taking (especially her Lantus). Motivation has improved.                     [] PHYSICIAN ACTION REQUESTED    [] See Addendum   [] Program schedule and visit dates            Hours furnished:      [] 0.5     [] 1    [] 1.5    [x] 2     [] 2.5     [] 3    Format:   [] Individual       [x] Group    Also present:   []  Family:     [] Friend:     [] Caregiver:        [] Other:                  [] Individual assessment              [] Individual education plan and needs     [x] BG goals and targets        [] Diabetes outcomes (clinical goals)      [x] Behavior goals:               [] set goals  [x] review achievement      [] A1c test, meaning and goal     [x] SMBG*         [x] SMBG schedule     [] BG meter receipt     Type/name of: [] BG meter review     [] Diabetes biology                                   [x] What increases/decreases BG           [x] Diabetes identification (on body, in wallet/purse)     [] Healthy eating*                            [] Body weight: [] healthy wt             [] risks when overwt     [] Food + BG logs:  [] set goals              [] reviewed logs     [] Pattern management with pts logs         [x] Taking meds*         [x] OTC meds                [] Sick day care       [] Being active*          [] Pattern management      [x] Reducing risks: hypo & hyperglycemia       [] Reducing risks*: chronic complications     [] Healthy coping & stress management*       [] Foot care       [] Obstacles to behavior change      [] Behavior goals achievement      [] Other diabetes outcomes               [] Reducing risks*: DKA + NKHHS                [] Problem solving*        [] Foot care       [] DSM support plan      [x] Pattern management     [] DSME follow-up in subsequent years      []  Monitor health status:             []  eye exam    []  Lipids            [] BP            [] urine microalbumin             [] GFR              [] other:        []  Review initial assessment; correct knowledge deficits      []  Patient--selected topics:          []  Other topic(s):           Educator Signature: Candelaria Melo RD, 11/29/2017 7:25 PM

## 2017-12-06 ENCOUNTER — HOSPITAL ENCOUNTER (OUTPATIENT)
Dept: NUTRITION | Age: 57
Discharge: HOME | End: 2017-12-06
Payer: MEDICARE

## 2017-12-06 PROCEDURE — G0109 DIAB MANAGE TRN IND/GROUP: HCPCS

## 2017-12-07 NOTE — PROGRESS NOTES
[facility] Outpatient Diabetes Self-Management Education Program      Dsme RECORD and PROGRESS NOTE         Patient: Murray Del Castillo : 1960         Physician: Jo Linares MD Date: 2017     VISIT No: []1 []2 [x]3 []4 []5 []6               [x] Initial                   [] Subsequent Year ACTUAL hours furnished: PROGRESS NOTE    A1C: Will be getting in 2018  Date A1C drawn:        [] Initial     [] Updated    Height: 62\" Weight: 242#              Date:2017  BMI:  44.3     [] Initial     [x] Updated                   Weight Change:  [x] Up    [] Down  2 lbs (Only for follow-up visit)     Diabetes Meds:  [] None   [] List: (Provide list at initial visit -only list changes at follow-up visits)  [x] No change   (But pt now consistently taking her Lantus)  Other Meds:  [] BP   [] lipids          [] depression/anxiety        [] other:        Behavior Goals Set Related To:   (List at initial visit only and add any new goals, if any, from follow-up visits)  [] Healthy eating      [] Being active  [] Taking meds        [] Monitoring   [] Reducing risks     [] Healthy coping  [] Problem-solving  []  Other:    [x] Separate Behavior Form Attached (use to assess progress on previously set goals)      [] Reviewed achievement of goals    Notes: Pt. Attended her 3rd class of the 3 class DSME series. Remains motivated to achieve her health goals and set new ones in the future. Consistently taking her medication now-understands the benefits and has overcome her fear. Pt will follow up with her registered dietitian next month.                    [] Program schedule and visit dates                  [] 0.5     [] 1    [] 1.5    [x] 2     [] 2.5     [] 3    Format:   [] Individual       [x] Group    Also present:   []  Family:     [] Friend:     [] Caregiver:        [] Other:                  [] Behavior goals:              [] set goals  [] review achievement     [] Prediabetes and Diabetes biology & diagnosis     [] BG goals and targets        [] A1c test, meaning and goal     [] Diabetes causes & risk factors        [] Purpose of Healthy Eating & tips     [] Body weight: [] healthy wt             [] risks when overwt/obese     [] Carbohydrate food sources & counting     [] Lean proteins & healthy fats     [] How foods/drinks affect BG     [] How to read a Food Label     [] Healthy Plate Method     [] Benefits of Being Active     [] Different types of exercise     [] Recommendations & safety issues for exercise     [x] Diabetes identification (on body, in wallet/purse)     [x] Obstacles to behavior change     [] S.M.A.R.T.  Goals     [] Taking medications:      [] oral   []  injections   [] insulin     [] Understanding types of meds     [] Proper storage and handling of meds     [] OTC and alternative meds     [] Purpose & benefits of Monitoring     [] Causes, prevention & treatment for hypo- & hyperglycemia     [] General guidelines for monitoring     [] SMBG and monitoring schedule     [] BG logs       [] reviewed logs     [x] Pattern management with SMBG log     [] BG meter receipt              Type/name of:                                    [] BG meter review     [] Reducing risks to prevent chronic complications     [x]  Monitor health status:             [x]  Eye exam    [x] BP                        [x]  Lipids/heart disease            [x]  Nerve/Neuropathy             [x] Foot care              [x]  Dental      [x] Sleep                    [x]  Kidney      [] other:     [x] Obstacles to behavior change      [x] Problem solving skills to overcome barriers and challenges     [x] The process and tips for problem solving     [x] Problem solving for hypo- & hyperglycemia     [x] Sick Day Management     [x] Healthy Coping & stress management     [x] DSM support plan      [] Individual assessment     [] Individual education plan and needs     []  Review initial assessment; correct knowledge deficits      [] Patient-selected topics:          []  Other topic(s):           Educator Signature: Fox Baeza RD, 12/6/2017 7:06 PM

## 2017-12-11 RX ORDER — ASPIRIN 325 MG
TABLET, DELAYED RELEASE (ENTERIC COATED) ORAL
Qty: 12 CAP | Refills: 0 | Status: SHIPPED | OUTPATIENT
Start: 2017-12-11 | End: 2017-12-22 | Stop reason: ALTCHOICE

## 2017-12-18 ENCOUNTER — OFFICE VISIT (OUTPATIENT)
Dept: OBGYN CLINIC | Age: 57
End: 2017-12-18

## 2017-12-18 VITALS
HEART RATE: 90 BPM | SYSTOLIC BLOOD PRESSURE: 111 MMHG | WEIGHT: 239 LBS | HEIGHT: 62 IN | BODY MASS INDEX: 43.98 KG/M2 | DIASTOLIC BLOOD PRESSURE: 72 MMHG

## 2017-12-18 DIAGNOSIS — B37.31 CANDIDA VAGINITIS: ICD-10-CM

## 2017-12-18 DIAGNOSIS — N76.0 BV (BACTERIAL VAGINOSIS): Primary | ICD-10-CM

## 2017-12-18 DIAGNOSIS — B96.89 BV (BACTERIAL VAGINOSIS): Primary | ICD-10-CM

## 2017-12-18 PROBLEM — E66.01 OBESITY, MORBID (HCC): Status: ACTIVE | Noted: 2017-12-18

## 2017-12-18 RX ORDER — NYSTATIN AND TRIAMCINOLONE ACETONIDE 100000; 1 [USP'U]/G; MG/G
CREAM TOPICAL
Qty: 30 G | Refills: 1 | Status: SHIPPED | OUTPATIENT
Start: 2017-12-18

## 2017-12-18 RX ORDER — METRONIDAZOLE 7.5 MG/G
1 GEL VAGINAL
Qty: 187.5 MG | Refills: 0 | Status: SHIPPED | OUTPATIENT
Start: 2017-12-18 | End: 2017-12-23

## 2017-12-18 RX ORDER — FLUCONAZOLE 150 MG/1
150 TABLET ORAL DAILY
Qty: 1 TAB | Refills: 0 | Status: SHIPPED | OUTPATIENT
Start: 2017-12-18 | End: 2017-12-19

## 2017-12-18 NOTE — PATIENT INSTRUCTIONS
Candidiasis: Care Instructions  Your Care Instructions  Candidiasis (say \"nox-wcx-KP-uh-ab\") is a yeast infection. Yeast normally lives in your body. But it can cause problems if your body's defenses don't work as they should. Some medicines can increase your chance of getting a yeast infection. These include antibiotics, steroids, and cancer drugs. And some diseases like AIDS and diabetes can make you more likely to get yeast infections. There are different types of yeast infections. Elizabeth Bent is a yeast infection in the mouth. It usually occurs in people with weak immune systems. It causes white patches inside the mouth and throat. Yeast infections of the skin usually occur in skin folds where the skin stays moist. They cause red, oozing patches on your skin. Babies can get these infections under the diaper. People who often wear gloves can get them on their hands. Many women get vaginal yeast infections. They are most common when women take antibiotics. These infections can cause the vagina to itch and burn. They also cause white discharge that looks like cottage cheese. In rare cases, yeast infects the blood. This can cause serious disease. This kind of infection is treated with medicine given through a needle into a vein (IV). After you start treatment, a yeast infection usually goes away quickly. But if your immune system is weak, the infection may come back. Tell your doctor if you get yeast infections often. Follow-up care is a key part of your treatment and safety. Be sure to make and go to all appointments, and call your doctor if you are having problems. It's also a good idea to know your test results and keep a list of the medicines you take. How can you care for yourself at home? · Take your medicines exactly as prescribed. Call your doctor if you think you are having a problem with your medicine. · Use antibiotics only as directed by your doctor. · Eat yogurt with live cultures.  It has bacteria called lactobacillus. It may help prevent some types of yeast infections. · Keep your skin clean and dry. Put powder on moist places. · If you are using a cream or suppository to treat a vaginal yeast infection, don't use condoms or a diaphragm. Use a different type of birth control. · Eat a healthy diet and get regular exercise. This will help keep your immune system strong. When should you call for help? Watch closely for changes in your health, and be sure to contact your doctor if:  ? · You do not get better as expected. Where can you learn more? Go to http://pippa-venkatesh.info/. Enter T673 in the search box to learn more about \"Candidiasis: Care Instructions. \"  Current as of: October 13, 2016  Content Version: 11.4  © 1004-1949 [employer]. Care instructions adapted under license by [employer] (which disclaims liability or warranty for this information). If you have questions about a medical condition or this instruction, always ask your healthcare professional. [address] any warranty or liability for your use of this information. Bacterial Vaginosis: Care Instructions  Your Care Instructions    Bacterial vaginosis is a type of vaginal infection. It is caused by excess growth of certain bacteria that are normally found in the vagina. Symptoms can include itching, swelling, pain when you urinate or have sex, and a gray or yellow discharge with a \"fishy\" odor. It is not considered an infection that is spread through sexual contact. Although symptoms can be annoying and uncomfortable, bacterial vaginosis does not usually cause other health problems. However, if you have it while you are pregnant, it can cause complications. While the infection may go away on its own, most doctors use antibiotics to treat it.  You may have been prescribed pills or vaginal cream. With treatment, bacterial vaginosis usually clears up in 5 to 7 days. Follow-up care is a key part of your treatment and safety. Be sure to make and go to all appointments, and call your doctor if you are having problems. It's also a good idea to know your test results and keep a list of the medicines you take. How can you care for yourself at home? · Take your antibiotics as directed. Do not stop taking them just because you feel better. You need to take the full course of antibiotics. · Do not eat or drink anything that contains alcohol if you are taking metronidazole (Flagyl). · Keep using your medicine if you start your period. Use pads instead of tampons while using a vaginal cream or suppository. Tampons can absorb the medicine. · Wear loose cotton clothing. Do not wear nylon and other materials that hold body heat and moisture close to the skin. · Do not scratch. Relieve itching with a cold pack or a cool bath. · Do not wash your vaginal area more than once a day. Use plain water or a mild, unscented soap. Do not douche. When should you call for help? Watch closely for changes in your health, and be sure to contact your doctor if:  ? · You have unexpected vaginal bleeding. ? · You have a fever. ? · You have new or increased pain in your vagina or pelvis. ? · You are not getting better after 1 week. ? · Your symptoms return after you finish the course of your medicine. Where can you learn more? Go to http://pippa-venkatesh.info/. Job Erika in the search box to learn more about \"Bacterial Vaginosis: Care Instructions. \"  Current as of: October 13, 2016  Content Version: 11.4  © 7286-6645 [employer]. Care instructions adapted under license by [employer] (which disclaims liability or warranty for this information).  If you have questions about a medical condition or this instruction, always ask your healthcare professional. [address] any warranty or liability for your use of this information.

## 2017-12-18 NOTE — PROGRESS NOTES
Subjective:   62 y.o. female complains of creamy and malodorous vaginal discharge for 2 weeks. She states she has external vaginal itching. She has un-controlled DM. Denies abnormal vaginal bleeding or significant pelvic pain or  fever. No UTI symptoms. Denies history of known exposure to STD. No LMP recorded. Patient is not currently having periods (Reason: Menopause). Objective:   She appears well, afebrile. Abdomen: benign, soft, nontender, no masses. Pelvic Exam: VULVA: normal appearing vulva with no masses, tenderness or lesions, VAGINA: normal appearing vagina with normal color and discharge, no lesions, CERVIX: normal appearing cervix without discharge or lesions. Microscopic wet-mount exam shows clue cells, excessive bacteria, hyphae, lactobacilli. .    Assessment/Plan:   bacterial vaginosis and C. albicans vulvovaginitis  GC and chlamydia DNA  probe sent to lab. Treatment: OTC yeast cream such as Monistat or Gyne-Lotrimin, MetroGel daily x 5 days and abstain from coitus during course of treatment  ROV prn if symptoms persist or worsen. ICD-10-CM ICD-9-CM    1. BV (bacterial vaginosis) N76.0 616.10 metroNIDAZOLE (METROGEL) 0.75 % vaginal gel    B96.89 041.9    2. Leslie vaginitis B37.3 112.1 nystatin-triamcinolone (MYCOLOG II) topical cream      fluconazole (DIFLUCAN) 150 mg tablet     The plan of care has been discussed with the patient. She has been given the opportunity to ask questions, which seem to have been answered satisfactorily.

## 2017-12-22 ENCOUNTER — OFFICE VISIT (OUTPATIENT)
Dept: FAMILY MEDICINE CLINIC | Age: 57
End: 2017-12-22

## 2017-12-22 VITALS
TEMPERATURE: 97.1 F | WEIGHT: 235 LBS | BODY MASS INDEX: 43.24 KG/M2 | HEART RATE: 74 BPM | HEIGHT: 62 IN | SYSTOLIC BLOOD PRESSURE: 103 MMHG | RESPIRATION RATE: 16 BRPM | DIASTOLIC BLOOD PRESSURE: 57 MMHG

## 2017-12-22 DIAGNOSIS — K51.90 ULCERATIVE COLITIS WITHOUT COMPLICATIONS, UNSPECIFIED LOCATION (HCC): Primary | ICD-10-CM

## 2017-12-22 DIAGNOSIS — E55.9 VITAMIN D DEFICIENCY: ICD-10-CM

## 2017-12-22 DIAGNOSIS — G56.01 CARPAL TUNNEL SYNDROME, RIGHT: ICD-10-CM

## 2017-12-22 DIAGNOSIS — C50.912 MALIGNANT NEOPLASM OF LEFT FEMALE BREAST, UNSPECIFIED ESTROGEN RECEPTOR STATUS, UNSPECIFIED SITE OF BREAST (HCC): ICD-10-CM

## 2017-12-22 DIAGNOSIS — Z86.010 HISTORY OF COLONIC POLYPS: ICD-10-CM

## 2017-12-22 DIAGNOSIS — E66.01 OBESITY, MORBID (HCC): ICD-10-CM

## 2017-12-22 PROBLEM — C50.919 BREAST CANCER (HCC): Status: ACTIVE | Noted: 2017-12-22

## 2017-12-22 RX ORDER — METFORMIN HYDROCHLORIDE 750 MG/1
1500 TABLET, EXTENDED RELEASE ORAL
Qty: 60 TAB | Refills: 5 | Status: SHIPPED | OUTPATIENT
Start: 2017-12-22 | End: 2018-07-18 | Stop reason: SDUPTHER

## 2017-12-22 RX ORDER — EXEMESTANE 25 MG/1
TABLET ORAL
Refills: 6 | COMMUNITY
Start: 2017-12-11

## 2017-12-22 RX ORDER — ACETAMINOPHEN 500 MG
2000 TABLET ORAL DAILY
Qty: 90 CAP | Refills: 3 | Status: SHIPPED | OUTPATIENT
Start: 2017-12-22

## 2017-12-22 NOTE — PROGRESS NOTES
Hair Lema is a 62 y.o. female and presents with Follow Up Chronic Condition; Diabetes; Vitamin D Deficiency; and Results (labs)       Subjective:    Right hand carpal tunnel- has to stop working due to pain. Wearing brace. Ulcerative colitis- diagnosed in [place]- no sx but pt states this comes and goes. Would like to see a GI physician. She also has a history of colon polyps. DM -doing very wellreview of glucometer blood sugars today shows recent sugars mostly in the low 100s. She has increased her Lantus appropriately. No hypoglycemia. Obesityshe has been very successful and lost about 10 pounds since her last visit with me. Breast cancerstatus post lumpectomy in 2015she is following with oncology at this point  Vitamin D deficiencyrepeat check shows an excellent level of 55 she is taking weekly 50,000 units. Assessment/Plan:    Right hand carpal tunnel- impeding work and using brace- will refer to Hand surgery. Ulcerative colitis- will refer to GI. H/o polyps of colon- refer to GI. DM - not yet controlled but much improved. Review of bs log shows low 100's bs recently- expect improved A1c in 3 months. Continue lantus- will try to stop glipizide then most likely. Increase metformin to 1500 mg/day. Obesityencouraged to continue weight loss at a rate of 1-2 pounds per week. Expect this will continue to improve her blood sugars. Breast cancerleftcontinue to follow with oncology. Vitamin D deficiencydramatically improved. Can reduce dose to 0197-1517 international units daily at this point. RTC in 3-4 months with labs.    Orders Placed This Encounter    HEMOGLOBIN A1C WITH EAG     Standing Status:   Future     Standing Expiration Date:   12/23/2018    VITAMIN D, 25 HYDROXY     Standing Status:   Future     Standing Expiration Date:   12/22/2018    exemestane (AROMASIN) 25 mg tablet     Sig: TAKE 1 TABLET BY MOUTH EVERY DAY     Refill:  6    metFORMIN ER (GLUCOPHAGE XR) 750 mg tablet Sig: Take 2 Tabs by mouth daily (with dinner). Dispense:  60 Tab     Refill:  5    Cholecalciferol, Vitamin D3, (VITAMIN D3) 2,000 unit cap capsule     Sig: Take 2,000 Units by mouth daily. Dispense:  90 Cap     Refill:  3           ROS:  Negative except as mentioned above  Cardiac- no chest pain or palpitations  Pulmonary- no sob or wheezes  GI- no n/v or diarrhea. SH:  Social History   Substance Use Topics    Smoking status: Never Smoker    Smokeless tobacco: Never Used    Alcohol use Yes      Comment: social         Medications/Allergies:  Current Outpatient Prescriptions on File Prior to Visit   Medication Sig Dispense Refill    metroNIDAZOLE (METROGEL) 0.75 % vaginal gel Insert 1 Applicator into vagina nightly for 5 days. 187.5 mg 0    nystatin-triamcinolone (MYCOLOG II) topical cream Apply  to affected area two (2) times daily as needed for Skin Irritation. 30 g 1    Cholecalciferol, Vitamin D3, 50,000 unit cap TAKE 1 CAP BY MOUTH EVERY SEVEN (7) DAYS. 12 Cap 0    glucose blood VI test strips (FREESTYLE TEST) strip Use to check blood sugar twice daily for DM II E11.65 200 Strip 3    insulin glargine (LANTUS SOLOSTAR) 100 unit/mL (3 mL) inpn 10 Units by SubCUTAneous route nightly. (Patient taking differently: 21 Units by SubCUTAneous route nightly.) 15 mL 3    Insulin Needles, Disposable, 30 gauge x 1/3\" Use to inject lantus daily sq. 100 Pen Needle 11    atorvastatin (LIPITOR) 40 mg tablet Take 1 Tab by mouth daily. 90 Tab 3    metFORMIN ER (GLUCOPHAGE XR) 500 mg tablet Take 2 Tabs by mouth daily (with dinner). 60 Tab 2    lisinopril (PRINIVIL, ZESTRIL) 5 mg tablet Take  by mouth daily.  glipiZIDE (GLUCOTROL) 10 mg tablet Take 5 mg by mouth two (2) times a day. No current facility-administered medications on file prior to visit.            No Known Allergies    Objective:  Visit Vitals    /57    Pulse 74    Temp 97.1 °F (36.2 °C) (Oral)    Resp 16    Ht 5' 2\" (1.575 m)    Wt 235 lb (106.6 kg)    BMI 42.98 kg/m2    Body mass index is 42.98 kg/(m^2). Constitutional: Well developed, nourished, no distress, alert, obese   CV: S1, S2.  RRR. No murmurs/rubs. No edema. Pulm: No abnormalities on inspection. Clear to auscultation bilaterally. No wheezing/rhonchi. Normal effort. GI: Soft, nontender, nondistended. Normal active bowel sounds. No  masses on palpation. No hepatosplenomegaly.

## 2017-12-22 NOTE — PROGRESS NOTES
1. Have you been to the ER, urgent care clinic since your last visit? Hospitalized since your last visit? No.     2. Have you seen or consulted any other health care providers outside of the [address] [place] [address] since your last visit? Include any pap smears or colon screening. Yes, saw Dr. Maria A Preciado 12/18/2017 and Aniyah on 12/20/2017.      Chief Complaint   Patient presents with    Follow Up Chronic Condition    Diabetes    Vitamin D Deficiency    Results     labs    Hand Pain     Right, need hand specialists referral

## 2017-12-22 NOTE — PATIENT INSTRUCTIONS
Counting Carbohydrates When You Take Insulin: Care Instructions  Your Care Instructions    You don't have to eat special foods when you take insulin. You just have to be careful to eat healthy foods. And you have to spread throughout the day the carbohydrates you eat. Carbohydrates raise blood sugar higher and more quickly than any other nutrient. It is found in desserts, breads and cereals, and fruit. It's also found in starchy vegetables such as potatoes and corn, grains such as rice and pasta, and milk and yogurt. The more carbohydrates, or carbs, you eat at one time, the higher your blood sugar will rise. Spreading carbs throughout the day helps keep your blood sugar levels within your target range. Counting carbs is one of the best ways to keep your blood sugar under control when you use insulin. It helps you match the right amount of insulin to the number of grams of carbohydrates in a meal. You need to test your blood sugar several times a day to learn how carbs affect you. Then you can change your diet and insulin dose as needed. A registered dietitian or certified diabetes educator can help you plan meals and snacks. Follow-up care is a key part of your treatment and safety. Be sure to make and go to all appointments, and call your doctor if you are having problems. It's also a good idea to know your test results and keep a list of the medicines you take. How can you care for yourself at home? Know your daily amount of carbohydrates  Your daily amount depends on several things, including your weight, how active you are, which diabetes medicines you take, and what your goals are for your blood sugar levels. A registered dietitian or certified diabetes educator can help you plan how many carbohydrates to include in each meal and snack. For most adults, a guideline for the daily amount of carbohydrates is:  · 45 to 60 grams at each meal. That's about the same as 3 to 4 carbohydrate servings.   · 15 to 20 grams at each snack. That's about the same as 1 carbohydrate serving. Count carbs  If you take insulin, you need to know how many grams of carbohydrates are in a meal. This lets you know how much rapid-acting insulin to take before you eat. If you use an insulin pump, you get a constant rate of insulin during the day. So the pump must be programmed at meals to give you extra insulin to cover the rise in blood sugar after meals. When you know how many carbohydrates you will eat, you can take the right amount of insulin. Or, if you always use the same amount of insulin, you need to make sure that you eat the same amount of carbs at meals. · Learn your own insulin-to-carbohydrates ratio. You and your diabetes health professional will figure out the ratio. You can do this by testing your blood sugar after meals. For example, you may need a certain amount of insulin for every 15 grams of carbohydrates. · Add up the carbohydrate grams in a meal. Then you can figure out how many units of insulin to take based on your insulin-to-carbohydrates ratio. · Look at labels on packaged foods. This can tell you how many carbohydrates are in a serving. You can also use guides from the American Diabetes Association. · Be aware of portions, or serving sizes. If a package has two servings and you eat the whole package, you need to double the number of grams of carbohydrates listed for one serving. · Protein, fat, and fiber do not raise blood sugar as much as carbs do. If you eat a lot of these nutrients in a meal, your blood sugar will rise more slowly than it would otherwise. · Exercise lowers blood sugar. You can use less insulin than you would if you were not doing exercise. Keep in mind that timing matters.  If you exercise within 1 hour after a meal, your body may need less insulin for that meal than it would if you exercised 3 hours after the meal. Test your blood sugar to find out how exercise affects your need for insulin. Eat from all food groups  · Eat at least three meals a day. · Plan meals to include food from all the food groups. ¨ Grains: 1 slice of bread (1 ounce), ½ cup of cooked cereal, and 1/3 cup of cooked pasta or rice. These have about 15 grams of carbohydrates in a serving. Choose whole grains. These include whole wheat bread or crackers, oatmeal, and brown rice. Have them more often than refined grains. ¨ Fruit: 1 small fresh fruit, such as an apple or orange; ½ of a banana; ½ cup of chopped, cooked, or canned fruit; ½ cup of fruit juice; 1 cup of melon or raspberries; and 2 tablespoons of dried fruit. These have about 15 grams of carbohydrates in a serving. ¨ Dairy: 1 cup of nonfat or low-fat milk and 2/3 cup of plain yogurt. These have about 15 grams of carbohydrates in a serving. ¨ Protein foods: Beef, chicken, turkey, fish, eggs, tofu, cheese, cottage cheese, and peanut butter. A serving size of meat is 3 ounces. This is about the size of a deck of cards. Examples of meat substitute serving sizes (equal to 1 ounce of meat) are 1/4 cup of cottage cheese, 1 egg, 1 tablespoon of peanut butter, and ½ cup of tofu. These have very little or no carbohydrates in a serving. ¨ Vegetables: Starchy vegetables such as ½ cup of cooked beans, peas, potatoes, or corn have about 15 grams of carbohydrates. Nonstarchy vegetables have very little carbohydrates. These include 1 cup of raw leafy vegetables (such as spinach), ½ cup of other vegetables (cooked or chopped), and 3/4 cup of vegetable juice. · Talk to your dietitian or diabetes educator about ways to add limited amounts of sweets into your meal plan. · If you drink alcohol:  ¨ Limit it to no more than 1 drink a day for women and 2 drinks a day for men. (One drink is 12 fl oz of beer, 5 fl oz of wine, or 1.5 fl oz liquor.)  ¨ Make sure to count drink mixers that have sugar in your total carbohydrate count.  These include cola, tonic water, [employer], and fruit juice. ¨ Eat a carbohydrate food along with your alcoholic drink. ¨ Check your blood sugar more often. This is because alcohol can lower your blood sugar too much. This may happen even hours later while you sleep. You may want to eat and adjust your insulin dose when you drink alcohol to prevent severe low blood sugar. ¨ Talk to your doctor. Alcohol may not be recommended when you are taking certain diabetes medicines. Where can you learn more? Go to http://pippa-venkatesh.info/. Enter Q377 in the search box to learn more about \"Counting Carbohydrates When You Take Insulin: Care Instructions. \"  Current as of: March 13, 2017  Content Version: 11.4  © 7775-7020 [employer]. Care instructions adapted under license by [employer] (which disclaims liability or warranty for this information). If you have questions about a medical condition or this instruction, always ask your healthcare professional. [place] [address] any warranty or liability for your use of this information.

## 2017-12-22 NOTE — MR AVS SNAPSHOT
Visit Information Date & Time Provider Department Dept. Phone Encounter #  
 12/22/2017  8:00 AM Lexii Mann, [address] [facility] [phone] [id number] Follow-up Instructions Return in about 3 months (around 3/22/2018) for 30 minute slot and labs prior. Enrrique Alexkostas Upcoming Health Maintenance Date Due  
 EYE EXAM RETINAL OR DILATED Q1 7/8/1970 Pneumococcal 19-64 Highest Risk (1 of 3 - PCV13) 7/8/1979 DTaP/Tdap/Td series (1 - Tdap) 7/8/1981 FOBT Q 1 YEAR AGE 50-75 7/8/2010 HEMOGLOBIN A1C Q6M 3/11/2018 MICROALBUMIN Q1 9/11/2018 LIPID PANEL Q1 9/11/2018 FOOT EXAM Q1 9/29/2018 PAP AKA CERVICAL CYTOLOGY 6/1/2020 Allergies as of 12/22/2017  Review Complete On: 12/22/2017 By: Lexii Mann MD  
 No Known Allergies Current Immunizations  Reviewed on 9/11/2017 Name Date Influenza Vaccine (Quad) PF 9/11/2017 Not reviewed this visit You Were Diagnosed With   
  
 Codes Comments Ulcerative colitis without complications, unspecified location [facility])    -  Primary ICD-10-CM: K51.90 ICD-9-CM: 556.9 Obesity, morbid ([facility]     ICD-10-CM: E66.01 
ICD-9-CM: 278.01 Vitamin D deficiency     ICD-10-CM: E55.9 ICD-9-CM: 268.9 Uncontrolled type 2 diabetes mellitus without complication, with long-term current use of insulin (HCC)     ICD-10-CM: E11.65, Z79.4 ICD-9-CM: 250.02, V58.67 Malignant neoplasm of left female breast, unspecified estrogen receptor status, unspecified site of breast ([facility]     ICD-10-CM: G82.394 ICD-9-CM: 174.9 Vitals BP Pulse Temp Resp Height(growth percentile) Weight(growth percentile) 103/57 74 97.1 °F (36.2 °C) (Oral) 16 5' 2\" (1.575 m) 235 lb (106.6 kg) BMI OB Status Smoking Status 42.98 kg/m2 Menopause Never Smoker BMI and BSA Data Body Mass Index Body Surface Area 42.98 kg/m 2 2.16 m 2 Preferred Pharmacy Pharmacy Name Phone [id number] [place], [address] [facility] Dominique Lutz [phone] Your Updated Medication List  
  
   
This list is accurate as of: 12/22/17  8:41 AM.  Always use your most recent med list.  
  
  
  
  
 atorvastatin 40 mg tablet Commonly known as:  LIPITOR Take 1 Tab by mouth daily. Cholecalciferol (Vitamin D3) 2,000 unit Cap capsule Commonly known as:  VITAMIN D3 Take 2,000 Units by mouth daily. exemestane 25 mg tablet Commonly known as:  AROMASIN  
TAKE 1 TABLET BY MOUTH EVERY DAY  
  
 glipiZIDE 10 mg tablet Commonly known as:  Allyssa Dayhoff Take 5 mg by mouth two (2) times a day. glucose blood VI test strips strip Commonly known as:  FREESTYLE TEST Use to check blood sugar twice daily for DM II E11.65  
  
 insulin glargine 100 unit/mL (3 mL) Inpn Commonly known as:  LANTUS SOLOSTAR  
10 Units by SubCUTAneous route nightly. Insulin Needles (Disposable) 30 gauge x 1/3\" Use to inject lantus daily sq.  
  
 lisinopril 5 mg tablet Commonly known as:  Mollie Ripa Take  by mouth daily. metFORMIN  mg tablet Commonly known as:  GLUCOPHAGE XR Take 2 Tabs by mouth daily (with dinner). metroNIDAZOLE 0.75 % vaginal gel Commonly known as:  Dontrell Rack Insert 1 Applicator into vagina nightly for 5 days. nystatin-triamcinolone topical cream  
Commonly known as:  MYCOLOG II Apply  to affected area two (2) times daily as needed for Skin Irritation. Prescriptions Sent to Pharmacy Refills  
 metFORMIN ER (GLUCOPHAGE XR) 750 mg tablet 5 Sig: Take 2 Tabs by mouth daily (with dinner). Class: Normal  
 Pharmacy: [address] [place] [address] Ph #: [phone] Route: Oral  
 Cholecalciferol, Vitamin D3, (VITAMIN D3) 2,000 unit cap capsule 3 Sig: Take 2,000 Units by mouth daily. Class: Normal  
 Pharmacy: [address] [place] [address] Ph #: [phone] Route: Oral  
  
Follow-up Instructions Return in about 3 months (around 3/22/2018) for 30 minute slot and labs prior. Cheko Downing To-Do List   
 12/22/2017 Lab:  HEMOGLOBIN A1C WITH EAG   
  
 12/22/2017 Lab:  VITAMIN D, 25 HYDROXY   
  
 06/18/2018  9:00 AM  
  Appointment with [address] at [address] [place] [address] ([phone]) EXAM INSTRUCTIONS -Remove deodorant, powder and/or perfume prior to exam. -If you are currently nursing, breast should be emptied prior to exam.  GENERAL INSTRUCTIONS -If you have a hand-written prescription for this exam, you must bring it with you to your appointment. -You may be responsible for any co-payments and deductibles as indicated by your insurance carrier. -Only patients will be allowed in the exam room, including children. -Children under the age of 15 may not be left unattended. -Bring all relevant prior images from facilities outside of [facility]. Failure to provide images may delay reading by Radiologist. -[address] [place] [address] patients must have an armband and be accompanied by a caregiver or family member. APPOINTMENT CANCELLATION To reschedule or cancel an appointment, please contact the Scheduling Department at [phone].  
  
 06/18/2018  9:30 AM  
  Appointment with Slade Jessica Garciaemeritacasandra at [address] [place] [address] ([phone]) DIET RESTRICTIONS DO NOT TAKE CALCIUM SUPPLIMENTS FOR 24 HRS PRIOR TO TESTS. GENERAL INSTRUCTIONS -If you have a hand-written prescription for this exam, you must bring it with you to your appointment. -You may be responsible for any co-payments and deductibles as indicated by your insurance carrier. -Only patients will be allowed in the exam room, including children. -Children under the age of 15 may not be left unattended. -[address] [facility] patients must have an armband and be accompanied by a caregiver or family member.   APPOINTMENT CANCELLATION To reschedule or cancel an appointment, please contact the Scheduling Department at [phone]. Patient Instructions Counting Carbohydrates When You Take Insulin: Care Instructions Your Care Instructions You don't have to eat special foods when you take insulin. You just have to be careful to eat healthy foods. And you have to spread throughout the day the carbohydrates you eat. Carbohydrates raise blood sugar higher and more quickly than any other nutrient. It is found in desserts, breads and cereals, and fruit. It's also found in starchy vegetables such as potatoes and corn, grains such as rice and pasta, and milk and yogurt. The more carbohydrates, or carbs, you eat at one time, the higher your blood sugar will rise. Spreading carbs throughout the day helps keep your blood sugar levels within your target range. Counting carbs is one of the best ways to keep your blood sugar under control when you use insulin. It helps you match the right amount of insulin to the number of grams of carbohydrates in a meal. You need to test your blood sugar several times a day to learn how carbs affect you. Then you can change your diet and insulin dose as needed. A registered dietitian or certified diabetes educator can help you plan meals and snacks. Follow-up care is a key part of your treatment and safety. Be sure to make and go to all appointments, and call your doctor if you are having problems. It's also a good idea to know your test results and keep a list of the medicines you take. How can you care for yourself at home? Know your daily amount of carbohydrates Your daily amount depends on several things, including your weight, how active you are, which diabetes medicines you take, and what your goals are for your blood sugar levels. A registered dietitian or certified diabetes educator can help you plan how many carbohydrates to include in each meal and snack. For most adults, a guideline for the daily amount of carbohydrates is: · 45 to 60 grams at each meal. That's about the same as 3 to 4 carbohydrate servings. · 15 to 20 grams at each snack. That's about the same as 1 carbohydrate serving. Count carbs If you take insulin, you need to know how many grams of carbohydrates are in a meal. This lets you know how much rapid-acting insulin to take before you eat. If you use an insulin pump, you get a constant rate of insulin during the day. So the pump must be programmed at meals to give you extra insulin to cover the rise in blood sugar after meals. When you know how many carbohydrates you will eat, you can take the right amount of insulin. Or, if you always use the same amount of insulin, you need to make sure that you eat the same amount of carbs at meals. · Learn your own insulin-to-carbohydrates ratio. You and your diabetes health professional will figure out the ratio. You can do this by testing your blood sugar after meals. For example, you may need a certain amount of insulin for every 15 grams of carbohydrates. · Add up the carbohydrate grams in a meal. Then you can figure out how many units of insulin to take based on your insulin-to-carbohydrates ratio. · Look at labels on packaged foods. This can tell you how many carbohydrates are in a serving. You can also use guides from the American Diabetes Association. · Be aware of portions, or serving sizes. If a package has two servings and you eat the whole package, you need to double the number of grams of carbohydrates listed for one serving. · Protein, fat, and fiber do not raise blood sugar as much as carbs do. If you eat a lot of these nutrients in a meal, your blood sugar will rise more slowly than it would otherwise. · Exercise lowers blood sugar. You can use less insulin than you would if you were not doing exercise. Keep in mind that timing matters.  If you exercise within 1 hour after a meal, your body may need less insulin for that meal than it would if you exercised 3 hours after the meal. Test your blood sugar to find out how exercise affects your need for insulin. Eat from all food groups · Eat at least three meals a day. · Plan meals to include food from all the food groups. ¨ Grains: 1 slice of bread (1 ounce), ½ cup of cooked cereal, and 1/3 cup of cooked pasta or rice. These have about 15 grams of carbohydrates in a serving. Choose whole grains. These include whole wheat bread or crackers, oatmeal, and brown rice. Have them more often than refined grains. ¨ Fruit: 1 small fresh fruit, such as an apple or orange; ½ of a banana; ½ cup of chopped, cooked, or canned fruit; ½ cup of fruit juice; 1 cup of melon or raspberries; and 2 tablespoons of dried fruit. These have about 15 grams of carbohydrates in a serving. ¨ Dairy: 1 cup of nonfat or low-fat milk and 2/3 cup of plain yogurt. These have about 15 grams of carbohydrates in a serving. ¨ Protein foods: Beef, chicken, turkey, fish, eggs, tofu, cheese, cottage cheese, and peanut butter. A serving size of meat is 3 ounces. This is about the size of a deck of cards. Examples of meat substitute serving sizes (equal to 1 ounce of meat) are 1/4 cup of cottage cheese, 1 egg, 1 tablespoon of peanut butter, and ½ cup of tofu. These have very little or no carbohydrates in a serving. ¨ Vegetables: Starchy vegetables such as ½ cup of cooked beans, peas, potatoes, or corn have about 15 grams of carbohydrates. Nonstarchy vegetables have very little carbohydrates. These include 1 cup of raw leafy vegetables (such as spinach), ½ cup of other vegetables (cooked or chopped), and 3/4 cup of vegetable juice. · Talk to your dietitian or diabetes educator about ways to add limited amounts of sweets into your meal plan. · If you drink alcohol: ¨ Limit it to no more than 1 drink a day for women and 2 drinks a day for men. (One drink is 12 fl oz of beer, 5 fl oz of wine, or 1.5 fl oz liquor.) ¨ Make sure to count drink mixers that have sugar in your total carbohydrate count. These include cola, tonic water, lynn mix, and fruit juice. ¨ Eat a carbohydrate food along with your alcoholic drink. ¨ Check your blood sugar more often. This is because alcohol can lower your blood sugar too much. This may happen even hours later while you sleep. You may want to eat and adjust your insulin dose when you drink alcohol to prevent severe low blood sugar. ¨ Talk to your doctor. Alcohol may not be recommended when you are taking certain diabetes medicines. Where can you learn more? Go to http://pippa-venkatesh.info/. Enter A396 in the search box to learn more about \"Counting Carbohydrates When You Take Insulin: Care Instructions. \" Current as of: March 13, 2017 Content Version: 11.4 © 1599-4387 [employer]. Care instructions adapted under license by [employer] (which disclaims liability or warranty for this information). If you have questions about a medical condition or this instruction, always ask your healthcare professional. [place] [address] any warranty or liability for your use of this information. Introducing hospitals & HEALTH SERVICES! Sofy Jerez introduces RocketOn patient portal. Now you can access parts of your medical record, email your doctor's office, and request medication refills online. 1. In your internet browser, go to [email or web address] 2. Click on the First Time User? Click Here link in the Sign In box. You will see the New Member Sign Up page. 3. Enter your RocketOn Access Code exactly as it appears below. You will not need to use this code after youve completed the sign-up process. If you do not sign up before the expiration date, you must request a new code.  
 
· RocketOn Access Code: [email or web address] 
 Expires: 3/22/2018  8:38 AM 
 
4. Enter the last four digits of your Social Security Number (xxxx) and Date of Birth (mm/dd/yyyy) as indicated and click Submit. You will be taken to the next sign-up page. 5. Create a Geodruid ID. This will be your Geodruid login ID and cannot be changed, so think of one that is secure and easy to remember. 6. Create a Geodruid password. You can change your password at any time. 7. Enter your Password Reset Question and Answer. This can be used at a later time if you forget your password. 8. Enter your e-mail address. You will receive e-mail notification when new information is available in [address] 9. Click Sign Up. You can now view and download portions of your medical record. 10. Click the Download Summary menu link to download a portable copy of your medical information. If you have questions, please visit the Frequently Asked Questions section of the Geodruid website. Remember, Geodruid is NOT to be used for urgent needs. For medical emergencies, dial 911. Now available from your iPhone and Android! Please provide this summary of care documentation to your next provider. Your primary care clinician is listed as NADER Mueller. If you have any questions after today's visit, please call [phone].

## 2018-02-02 ENCOUNTER — OFFICE VISIT (OUTPATIENT)
Dept: FAMILY MEDICINE CLINIC | Age: 58
End: 2018-02-02

## 2018-02-02 VITALS
SYSTOLIC BLOOD PRESSURE: 122 MMHG | RESPIRATION RATE: 17 BRPM | DIASTOLIC BLOOD PRESSURE: 63 MMHG | WEIGHT: 240.2 LBS | BODY MASS INDEX: 44.2 KG/M2 | HEIGHT: 62 IN | TEMPERATURE: 96.1 F | HEART RATE: 61 BPM

## 2018-02-02 DIAGNOSIS — M54.50 ACUTE LEFT-SIDED LOW BACK PAIN WITHOUT SCIATICA: Primary | ICD-10-CM

## 2018-02-02 DIAGNOSIS — C50.912 MALIGNANT NEOPLASM OF LEFT FEMALE BREAST, UNSPECIFIED ESTROGEN RECEPTOR STATUS, UNSPECIFIED SITE OF BREAST (HCC): ICD-10-CM

## 2018-02-02 RX ORDER — NAPROXEN 500 MG/1
TABLET ORAL
Refills: 0 | COMMUNITY
Start: 2018-01-22 | End: 2018-02-02 | Stop reason: SDUPTHER

## 2018-02-02 RX ORDER — OXYCODONE AND ACETAMINOPHEN 5; 325 MG/1; MG/1
TABLET ORAL
Refills: 0 | COMMUNITY
Start: 2018-01-22

## 2018-02-02 RX ORDER — NAPROXEN 500 MG/1
TABLET ORAL
Qty: 60 TAB | Refills: 1 | Status: SHIPPED | OUTPATIENT
Start: 2018-02-02 | End: 2018-05-17 | Stop reason: SDUPTHER

## 2018-02-02 RX ORDER — METHOCARBAMOL 500 MG/1
TABLET, FILM COATED ORAL
Refills: 0 | COMMUNITY
Start: 2018-01-22

## 2018-02-02 NOTE — PATIENT INSTRUCTIONS
ONLY add tylenol or Percocet (not both) to the naprosyn. No other pain meds please. Back Pain: Care Instructions  Your Care Instructions    Back pain has many possible causes. It is often related to problems with muscles and ligaments of the back. It may also be related to problems with the nerves, discs, or bones of the back. Moving, lifting, standing, sitting, or sleeping in an awkward way can strain the back. Sometimes you don't notice the injury until later. Arthritis is another common cause of back pain. Although it may hurt a lot, back pain usually improves on its own within several weeks. Most people recover in 12 weeks or less. Using good home treatment and being careful not to stress your back can help you feel better sooner. Follow-up care is a key part of your treatment and safety. Be sure to make and go to all appointments, and call your doctor if you are having problems. It's also a good idea to know your test results and keep a list of the medicines you take. How can you care for yourself at home? · Sit or lie in positions that are most comfortable and reduce your pain. Try one of these positions when you lie down:  ¨ Lie on your back with your knees bent and supported by large pillows. ¨ Lie on the floor with your legs on the seat of a sofa or chair. Luís Jocelynn on your side with your knees and hips bent and a pillow between your legs. ¨ Lie on your stomach if it does not make pain worse. · Do not sit up in bed, and avoid soft couches and twisted positions. Bed rest can help relieve pain at first, but it delays healing. Avoid bed rest after the first day of back pain. · Change positions every 30 minutes. If you must sit for long periods of time, take breaks from sitting. Get up and walk around, or lie in a comfortable position. · Try using a heating pad on a low or medium setting for 15 to 20 minutes every 2 or 3 hours. Try a warm shower in place of one session with the heating pad.   · You can also try an ice pack for 10 to 15 minutes every 2 to 3 hours. Put a thin cloth between the ice pack and your skin. · Take pain medicines exactly as directed. ¨ If the doctor gave you a prescription medicine for pain, take it as prescribed. ¨ If you are not taking a prescription pain medicine, ask your doctor if you can take an over-the-counter medicine. · Take short walks several times a day. You can start with 5 to 10 minutes, 3 or 4 times a day, and work up to longer walks. Walk on level surfaces and avoid hills and stairs until your back is better. · Return to work and other activities as soon as you can. Continued rest without activity is usually not good for your back. · To prevent future back pain, do exercises to stretch and strengthen your back and stomach. Learn how to use good posture, safe lifting techniques, and proper body mechanics. When should you call for help? Call your doctor now or seek immediate medical care if:  ? · You have new or worsening numbness in your legs. ? · You have new or worsening weakness in your legs. (This could make it hard to stand up.)   ? · You lose control of your bladder or bowels. ? Watch closely for changes in your health, and be sure to contact your doctor if:  ? · Your pain gets worse. ? · You are not getting better after 2 weeks. Where can you learn more? Go to http://pippa-venkatesh.info/. Enter H268 in the search box to learn more about \"Back Pain: Care Instructions. \"  Current as of: March 21, 2017  Content Version: 11.4  © 0311-4794 [employer]. Care instructions adapted under license by [employer] (which disclaims liability or warranty for this information). If you have questions about a medical condition or this instruction, always ask your healthcare professional. [address] any warranty or liability for your use of this information.

## 2018-02-02 NOTE — PROGRESS NOTES
Wendy Lebron is a 62 y.o. female and presents with ED Follow-up and Back Pain       Subjective:    Pain started one week ago. Left sided low back pain and radiates down to mid thigh. \"feels like its getting very tight\" pain is intense sharp pain. \"off the charts\"/10. No alleviating factors. Muscle relaxer \"make me out cold\". No change with position. No trauma. Started while walking. At one point felt she could not move due to the pain. Reviewed xrays from ER- arthritis changes and disc space narrowing. Assessment/Plan:    Right low back pain- new sudden onset, will order MRI given h/o breast Ca and spinous process ttp. Patient was carefully instructed on pain medication use including avoiding extra Tylenol doses with Percocet and only using Naprosyn and no other NSAIDs. The patient was advised that NSAID-type medications have two very important potential side effects: gastrointestinal irritation including hemorrhage and renal injuries. She was asked to take the medication with food and to stop if she experiences any GI upset. I asked her to call for vomiting, abdominal pain or black/bloody stools. The patient expresses understanding of these issues and questions were answered. Breast cancer being followed by oncology and no known metastases  DM- bs 190 in ER- pt asked to check bs every day and bring in log to next visit. Avoiding steroids given poor diabetes control. This was discussed with her in detail. rtc 1 week or after MRI  Orders Placed This Encounter    MRI LUMB SPINE WO CONT     Standing Status:   Future     Number of Occurrences:   1     Standing Expiration Date:   3/2/2019     Order Specific Question:   Is Patient Allergic to Contrast Dye?      Answer:   No    methocarbamol (ROBAXIN) 500 mg tablet     Sig: TAKE 1 TABLET BY MOUTH 4 TIMES DAILY FOR 10 DAYS     Refill:  0    DISCONTD: naproxen (NAPROSYN) 500 mg tablet     Sig: TAKE 1 TABLET BY MOUTH TWICE DAILY     Refill:  0    oxyCODONE-acetaminophen (PERCOCET) 5-325 mg per tablet     Sig: TAKE 1 TABLET BY MOUTH EVERY 4 HOURS AS NEEDED FOR 10 DAYS     Refill:  0    naproxen (NAPROSYN) 500 mg tablet     Sig: TAKE 1 TABLET BY MOUTH TWICE DAILY     Dispense:  60 Tab     Refill:  1       Diagnoses and all orders for this visit:    1. Acute left-sided low back pain without sciatica  -     naproxen (NAPROSYN) 500 mg tablet; TAKE 1 TABLET BY MOUTH TWICE DAILY  -     MRI LUMB SPINE WO CONT; Future    2. Malignant neoplasm of left female breast, unspecified estrogen receptor status, unspecified site of breast (HCC)  -     naproxen (NAPROSYN) 500 mg tablet; TAKE 1 TABLET BY MOUTH TWICE DAILY  -     MRI LUMB SPINE WO CONT; Future          ROS:  Negative except as mentioned above  Cardiac- no chest pain or palpitations  Pulmonary- no sob or wheezes  GI- no n/v or diarrhea. SH:  Social History   Substance Use Topics    Smoking status: Never Smoker    Smokeless tobacco: Never Used    Alcohol use Yes      Comment: social         Medications/Allergies:  Current Outpatient Prescriptions on File Prior to Visit   Medication Sig Dispense Refill    exemestane (AROMASIN) 25 mg tablet TAKE 1 TABLET BY MOUTH EVERY DAY  6    metFORMIN ER (GLUCOPHAGE XR) 750 mg tablet Take 2 Tabs by mouth daily (with dinner). 60 Tab 5    Cholecalciferol, Vitamin D3, (VITAMIN D3) 2,000 unit cap capsule Take 2,000 Units by mouth daily. 90 Cap 3    nystatin-triamcinolone (MYCOLOG II) topical cream Apply  to affected area two (2) times daily as needed for Skin Irritation. 30 g 1    glucose blood VI test strips (FREESTYLE TEST) strip Use to check blood sugar twice daily for DM II E11.65 200 Strip 3    insulin glargine (LANTUS SOLOSTAR) 100 unit/mL (3 mL) inpn 10 Units by SubCUTAneous route nightly. (Patient taking differently: 21 Units by SubCUTAneous route nightly. 21 units bedtime) 15 mL 3    Insulin Needles, Disposable, 30 gauge x 1/3\" Use to inject lantus daily sq. 100 Pen Needle 11    atorvastatin (LIPITOR) 40 mg tablet Take 1 Tab by mouth daily. 90 Tab 3    lisinopril (PRINIVIL, ZESTRIL) 5 mg tablet Take  by mouth daily.  glipiZIDE (GLUCOTROL) 10 mg tablet Take 5 mg by mouth two (2) times a day. No current facility-administered medications on file prior to visit. No Known Allergies    Objective:  Visit Vitals    /63    Pulse 61    Temp 96.1 °F (35.6 °C) (Oral)    Resp 17    Ht 5' 2\" (1.575 m)    Wt 240 lb 3.2 oz (109 kg)    BMI 43.93 kg/m2    Body mass index is 43.93 kg/(m^2). Constitutional: Well developed, nourished, no distress, alert, obese    Back  tenderness to palpation in the midline over the fourth and fifth lumbar spinous processes. No clear palpable muscle spasm   CV: S1, S2.  RRR. No murmurs/rubs. No thrills palpated. No carotid bruits. Intact distal pulses. No edema. neuro  alert and oriented ×3, normal gait, no sensory deficits, strength is 5 out of 5 lower extremities with normal heel and toe walking. Deep tendon reflexes are 2+ bilaterally and equal quadriceps and Achilles. Pulm: No abnormalities on inspection. Clear to auscultation bilaterally. No wheezing/rhonchi. Normal effort. GI: Soft, nontender, nondistended. Normal active bowel sounds. No  masses on palpation. No hepatosplenomegaly.

## 2018-02-02 NOTE — MR AVS SNAPSHOT
Hemal Rico L[place] [address] [facility] [address] [id number] 
[phone] Patient: Maikol Neal MRN: [id number] Visit Information Date & Time Provider Department Dept. Phone Encounter #  
 2/2/2018  7:45 AM Andrea Lopez, [address] [facility] [phone] [id number] Follow-up Instructions Return in about 1 week (around 2/9/2018) for or when available after MRI is done. .  
  
Your Appointments 3/13/2018  8:15 AM  
LAB with [facility] [place] [address] ([address]) Appt Note: labs [facility] [address]  
  
   
 [address] [facility]  
  
    
 3/20/2018  8:00 AM  
Follow Up with Andrea Lopez MD  
[address]) Appt Note: 3 month fu appt [facility] [address]  
  
   
 [address] Upcoming Health Maintenance Date Due  
 EYE EXAM RETINAL OR DILATED Q1 7/8/1970 Pneumococcal 19-64 Highest Risk (1 of 3 - PCV13) 7/8/1979 DTaP/Tdap/Td series (1 - Tdap) 7/8/1981 FOBT Q 1 YEAR AGE 50-75 7/8/2010 HEMOGLOBIN A1C Q6M 3/11/2018 MICROALBUMIN Q1 9/11/2018 LIPID PANEL Q1 9/11/2018 FOOT EXAM Q1 9/29/2018 BREAST CANCER SCRN MAMMOGRAM 6/16/2019 PAP AKA CERVICAL CYTOLOGY 6/1/2020 Allergies as of 2/2/2018  Review Complete On: 2/2/2018 By: Andrea Lopez MD  
 No Known Allergies Current Immunizations  Reviewed on 9/11/2017 Name Date Influenza Vaccine (Quad) PF 9/11/2017 Not reviewed this visit You Were Diagnosed With   
  
 Codes Comments Acute left-sided low back pain without sciatica    -  Primary ICD-10-CM: M54.5 ICD-9-CM: 724.2 Malignant neoplasm of left female breast, unspecified estrogen receptor status, unspecified site of breast ([facility]     ICD-10-CM: B77.841 ICD-9-CM: 174.9 Vitals BP Pulse Temp Resp Height(growth percentile) Weight(growth percentile) 122/63 61 96.1 °F (35.6 °C) (Oral) 17 5' 2\" (1.575 m) 240 lb 3.2 oz (109 kg) BMI OB Status Smoking Status 43.93 kg/m2 Menopause Never Smoker Vitals History BMI and BSA Data Body Mass Index Body Surface Area  
 43.93 kg/m 2 2.18 m 2 Preferred Pharmacy Pharmacy Name Phone [id number], [address] [facility] Shamika Diaz [phone] Your Updated Medication List  
  
   
This list is accurate as of: 2/2/18  8:20 AM.  Always use your most recent med list.  
  
  
  
  
 atorvastatin 40 mg tablet Commonly known as:  LIPITOR Take 1 Tab by mouth daily. Cholecalciferol (Vitamin D3) 2,000 unit Cap capsule Commonly known as:  VITAMIN D3 Take 2,000 Units by mouth daily. exemestane 25 mg tablet Commonly known as:  AROMASIN  
TAKE 1 TABLET BY MOUTH EVERY DAY  
  
 glipiZIDE 10 mg tablet Commonly known as:  Mariam Boogie Take 5 mg by mouth two (2) times a day. glucose blood VI test strips strip Commonly known as:  FREESTYLE TEST Use to check blood sugar twice daily for DM II E11.65  
  
 insulin glargine 100 unit/mL (3 mL) Inpn Commonly known as:  LANTUS SOLOSTAR  
10 Units by SubCUTAneous route nightly. Insulin Needles (Disposable) 30 gauge x 1/3\" Use to inject lantus daily sq.  
  
 lisinopril 5 mg tablet Commonly known as:  Hilmilagros Nava Take  by mouth daily. metFORMIN  mg tablet Commonly known as:  GLUCOPHAGE XR Take 2 Tabs by mouth daily (with dinner). methocarbamol 500 mg tablet Commonly known as:  ROBAXIN  
TAKE 1 TABLET BY MOUTH 4 TIMES DAILY FOR 10 DAYS  
  
 naproxen 500 mg tablet Commonly known as:  NAPROSYN  
TAKE 1 TABLET BY MOUTH TWICE DAILY  
  
 nystatin-triamcinolone topical cream  
Commonly known as:  MYCOLOG II Apply  to affected area two (2) times daily as needed for Skin Irritation. oxyCODONE-acetaminophen 5-325 mg per tablet Commonly known as:  PERCOCET TAKE 1 TABLET BY MOUTH EVERY 4 HOURS AS NEEDED FOR 10 DAYS Prescriptions Sent to Pharmacy Refills  
 naproxen (NAPROSYN) 500 mg tablet 1 Sig: TAKE 1 TABLET BY MOUTH TWICE DAILY Class: Normal  
 Pharmacy: [address] [place] [address], [address] [place] [address] #: [phone] Follow-up Instructions Return in about 1 week (around 2/9/2018) for or when available after MRI is done. Jaylene  To-Do List   
 06/18/2018  9:00 AM  
  Appointment with [address] at [address] [place] [address] ([phone]) EXAM INSTRUCTIONS -Remove deodorant, powder and/or perfume prior to exam. -If you are currently nursing, breast should be emptied prior to exam.  GENERAL INSTRUCTIONS -If you have a hand-written prescription for this exam, you must bring it with you to your appointment. -You may be responsible for any co-payments and deductibles as indicated by your insurance carrier. -Only patients will be allowed in the exam room, including children. -Children under the age of 15 may not be left unattended. -Bring all relevant prior images from facilities outside of [facility]. Failure to provide images may delay reading by Radiologist. -[facility] patients must have an armband and be accompanied by a caregiver or family member. APPOINTMENT CANCELLATION To reschedule or cancel an appointment, please contact the Scheduling Department at [phone].  
  
 06/18/2018  9:30 AM  
  Appointment with Slade Milligan at [address] [place] [address] ([phone]) DIET RESTRICTIONS DO NOT TAKE CALCIUM SUPPLIMENTS FOR 24 HRS PRIOR TO TESTS.   GENERAL INSTRUCTIONS -If you have a hand-written prescription for this exam, you must bring it with you to your appointment. -[facility] may be responsible for any co-payments and deductibles as indicated by your insurance carrier. -Only patients will be allowed in the exam room, including children. -Children under the age of 15 may not be left unattended. -[facility] patients must have an armband and be accompanied by a caregiver or family member. APPOINTMENT CANCELLATION To reschedule or cancel an appointment, please contact the Scheduling Department at [phone]. Patient Instructions ONLY add tylenol or Percocet (not both) to the naprosyn. No other pain meds please. Back Pain: Care Instructions Your Care Instructions Back pain has many possible causes. It is often related to problems with muscles and ligaments of the back. It may also be related to problems with the nerves, discs, or bones of the back. Moving, lifting, standing, sitting, or sleeping in an awkward way can strain the back. Sometimes you don't notice the injury until later. Arthritis is another common cause of back pain. Although it may hurt a lot, back pain usually improves on its own within several weeks. Most people recover in 12 weeks or less. Using good home treatment and being careful not to stress your back can help you feel better sooner. Follow-up care is a key part of your treatment and safety. Be sure to make and go to all appointments, and call your doctor if you are having problems. It's also a good idea to know your test results and keep a list of the medicines you take. How can you care for yourself at home? · Sit or lie in positions that are most comfortable and reduce your pain. Try one of these positions when you lie down: ¨ Lie on your back with your knees bent and supported by large pillows. ¨ Lie on the floor with your legs on the seat of a sofa or chair. [place] on your side with your knees and hips bent and a pillow between your legs. ¨ Lie on your stomach if it does not make pain worse. · Do not sit up in bed, and avoid soft couches and twisted positions.  Bed rest can help relieve pain at first, but it delays healing. Avoid bed rest after the first day of back pain. · Change positions every 30 minutes. If you must sit for long periods of time, take breaks from sitting. Get up and walk around, or lie in a comfortable position. · Try using a heating pad on a low or medium setting for 15 to 20 minutes every 2 or 3 hours. Try a warm shower in place of one session with the heating pad. · You can also try an ice pack for 10 to 15 minutes every 2 to 3 hours. Put a thin cloth between the ice pack and your skin. · Take pain medicines exactly as directed. ¨ If the doctor gave you a prescription medicine for pain, take it as prescribed. ¨ If you are not taking a prescription pain medicine, ask your doctor if you can take an over-the-counter medicine. · Take short walks several times a day. You can start with 5 to 10 minutes, 3 or 4 times a day, and work up to longer walks. Walk on level surfaces and avoid hills and stairs until your back is better. · Return to work and other activities as soon as you can. Continued rest without activity is usually not good for your back. · To prevent future back pain, do exercises to stretch and strengthen your back and stomach. Learn how to use good posture, safe lifting techniques, and proper body mechanics. When should you call for help? Call your doctor now or seek immediate medical care if: 
? · You have new or worsening numbness in your legs. ? · You have new or worsening weakness in your legs. (This could make it hard to stand up.) ? · You lose control of your bladder or bowels. ? Watch closely for changes in your health, and be sure to contact your doctor if: 
? · Your pain gets worse. ? · You are not getting better after 2 weeks. Where can you learn more? Go to http://pippa-venkatesh.info/. Enter R157 in the search box to learn more about \"Back Pain: Care Instructions. \" Current as of: March 21, 2017 Content Version: 11.4 © 1233-2161 Healthwise, [employer]. Care instructions adapted under license by [employer] (which disclaims liability or warranty for this information). If you have questions about a medical condition or this instruction, always ask your healthcare professional. [address] any warranty or liability for your use of this information. Introducing [facility]! [facility] introduces woohoo mobile marketing patient portal. Now you can access parts of your medical record, email your doctor's office, and request medication refills online. 1. In your internet browser, go to [email or web address] 2. Click on the First Time User? Click Here link in the Sign In box. You will see the New Member Sign Up page. 3. Enter your woohoo mobile marketing Access Code exactly as it appears below. You will not need to use this code after youve completed the sign-up process. If you do not sign up before the expiration date, you must request a new code. · woohoo mobile marketing Access Code: [email or web address] Expires: 3/22/2018  8:38 AM 
 
4. Enter the last four digits of your Social Security Number (xxxx) and Date of Birth (mm/dd/yyyy) as indicated and click Submit. You will be taken to the next sign-up page. 5. Create a woohoo mobile marketing ID. This will be your woohoo mobile marketing login ID and cannot be changed, so think of one that is secure and easy to remember. 6. Create a woohoo mobile marketing password. You can change your password at any time. 7. Enter your Password Reset Question and Answer. This can be used at a later time if you forget your password. 8. Enter your e-mail address. You will receive e-mail notification when new information is available in [address] 9. Click Sign Up. You can now view and download portions of your medical record. 10. Click the Download Summary menu link to download a portable copy of your medical information. If you have questions, please visit the Frequently Asked Questions section of the [employer] website. Remember, [employer] is NOT to be used for urgent needs. For medical emergencies, dial 911. Now available from your iPhone and Android! Please provide this summary of care documentation to your next provider. Your primary care clinician is listed as NADER Mueller. If you have any questions after today's visit, please call [phone].

## 2018-02-02 NOTE — PROGRESS NOTES
1. Have you been to the ER, urgent care clinic since your last visit? Hospitalized since your last visit? Went to [facility] on 1/31/2018 for Sciatica. 2. Have you seen or consulted any other health care providers outside of the [address] [place] [address] since your last visit? Include any pap smears or colon screening.  No.     Chief Complaint   Patient presents with    ED Follow-up    Back Pain

## 2018-02-09 ENCOUNTER — HOSPITAL ENCOUNTER (OUTPATIENT)
Dept: MRI IMAGING | Age: 58
Discharge: HOME | End: 2018-02-09
Attending: INTERNAL MEDICINE
Payer: MEDICAID

## 2018-02-09 DIAGNOSIS — C50.912 MALIGNANT NEOPLASM OF LEFT FEMALE BREAST, UNSPECIFIED ESTROGEN RECEPTOR STATUS, UNSPECIFIED SITE OF BREAST (HCC): ICD-10-CM

## 2018-02-09 DIAGNOSIS — M54.50 ACUTE LEFT-SIDED LOW BACK PAIN WITHOUT SCIATICA: ICD-10-CM

## 2018-02-09 PROCEDURE — 72148 MRI LUMBAR SPINE W/O DYE: CPT

## 2018-05-03 ENCOUNTER — OFFICE VISIT (OUTPATIENT)
Dept: FAMILY MEDICINE CLINIC | Age: 58
End: 2018-05-03

## 2018-05-03 VITALS
WEIGHT: 241.6 LBS | HEIGHT: 62 IN | HEART RATE: 65 BPM | BODY MASS INDEX: 44.46 KG/M2 | DIASTOLIC BLOOD PRESSURE: 57 MMHG | TEMPERATURE: 97.2 F | SYSTOLIC BLOOD PRESSURE: 106 MMHG | RESPIRATION RATE: 16 BRPM

## 2018-05-03 DIAGNOSIS — E55.9 VITAMIN D DEFICIENCY: ICD-10-CM

## 2018-05-03 DIAGNOSIS — Z85.3 HISTORY OF LEFT BREAST CANCER: ICD-10-CM

## 2018-05-03 DIAGNOSIS — R93.7 ABNORMAL MRI, LUMBAR SPINE: ICD-10-CM

## 2018-05-03 DIAGNOSIS — M48.061 SPINAL STENOSIS OF LUMBAR REGION AT MULTIPLE LEVELS: ICD-10-CM

## 2018-05-03 NOTE — PATIENT INSTRUCTIONS
Please call your oncologist to get a follow up sooner. [facility] will call you today to schedule a bone scan. Call them tomorrow [phone] to set this up if they do not call you today. Venlafaxine (By mouth)   Venlafaxine ([id number])  Treats depression, generalized anxiety disorder, panic disorder, and social anxiety disorder. Brand Name(s): Effexor XR   There may be other brand names for this medicine. When This Medicine Should Not Be Used: This medicine is not right for everyone. Do not use it if you had an allergic reaction to venlafaxine or desvenlafaxine succinate. How to Use This Medicine:   Long Acting Capsule, Tablet, Long Acting Tablet  · Take your medicine as directed. Your dose may need to be changed several times to find what works best for you. · It is best to take the extended-release capsule at the same time each day (either in the morning or evening). · It is best to take this medicine with food or milk. · Swallow the extended-release capsule whole. Do not crush, break, or chew it. Do not place the capsule in a liquid. · If you cannot swallow the extended-release capsule, you may open it and pour the medicine into a small amount of soft food such as pudding, yogurt, or applesauce. Stir this mixture well and swallow it without chewing. · This medicine should come with a Medication Guide. Ask your pharmacist for a copy if you do not have one. · Missed dose: Take a dose as soon as you remember. If it is almost time for your next dose, wait until then and take a regular dose. Do not take extra medicine to make up for a missed dose. · Store the medicine in a closed container at room temperature, away from heat, moisture, and direct light. Drugs and Foods to Avoid:   Ask your doctor or pharmacist before using any other medicine, including over-the-counter medicines, vitamins, and herbal products. · Do not use this medicine if you have used an MAO inhibitor within the past 14 days.  Do not take an MAO inhibitor for at least 7 days after you stop this medicine. · Some medicines can affect how venlafaxine works. Tell your doctor if you are using any of the following:   ¨ Buspirone, cimetidine, fentanyl, ketoconazole, lithium, metoprolol, mirtazapine, Efren's wort, tramadol, or tryptophan supplements  ¨ Amphetamines  ¨ Blood thinner (including warfarin)  ¨ Diuretic (water pill)  ¨ Medicine for migraine headaches  ¨ Medicine to lose weight (including phentermine)  ¨ NSAID pain or arthritis medicine (including aspirin, celecoxib, diclofenac, ibuprofen, naproxen)  ¨ Tricyclic antidepressant  · Do not drink alcohol while you are using this medicine. · Tell your doctor if you use anything else that makes you sleepy. Some examples are allergy medicine, narcotic pain medicine, and alcohol. Warnings While Using This Medicine:   · Tell your doctor if you are pregnant or breastfeeding, or if you have kidney disease, liver disease, glaucoma, heart disease, high blood pressure, or thyroid problems. Tell your doctor if you have a history of elyse, seizures, heart attack, or stroke. · This medicine can increase thoughts of suicide. Tell your doctor right away if you start to feel depressed and have thoughts about hurting yourself. · This medicine may cause the following problems:   ¨ Serotonin syndrome (when used with certain medicines)  ¨ Increased cholesterol levels  ¨ Increased blood pressure  ¨ Increased risk of bleeding problems  ¨ Low sodium levels  ¨ Interstitial lung disease and eosinophilic pneumonia  · This medicine may make you dizzy or drowsy. Do not drive or do anything that could be dangerous until you know how this medicine affects you. · Do not stop using this medicine suddenly. Your doctor will need to slowly decrease your dose before you stop it completely. · Tell any doctor or dentist who treats you that you are using this medicine.  This medicine may affect certain medical test results. · Your doctor will do lab tests at regular visits to check on the effects of this medicine. Keep all appointments. · Keep all medicine out of the reach of children. Never share your medicine with anyone. Possible Side Effects While Using This Medicine:   Call your doctor right away if you notice any of these side effects:  · Allergic reaction: Itching or hives, swelling in your face or hands, swelling or tingling in your mouth or throat, chest tightness, trouble breathing  · Anxiety, restlessness, fever, sweating, muscle spasms, nausea, vomiting, diarrhea, seeing or hearing things that are not there  · Blistering, peeling, red skin rash  · Chest pain, cough, trouble breathing  · Confusion, weakness, and muscle twitching  · Eye pain, vision changes, seeing halos around lights  · Fast or pounding heartbeat  · Feeling more excited or energetic than usual  · Headache, trouble concentrating, memory problems, unsteadiness  · Seizures  · Unusual behavior, thoughts of hurting yourself or others, trouble sleeping, nervousness, unusual dreams  · Unusual bleeding or bruising  If you notice these less serious side effects, talk with your doctor:   · Dry mouth  · Mild nausea, constipation, vomiting, loss of appetite, weight loss  · Sexual problems  · Sleepiness or unusual drowsiness, dizziness  If you notice other side effects that you think are caused by this medicine, tell your doctor. Call your doctor for medical advice about side effects. You may report side effects to FDA at 5-066-FDA-0400  © 2017 [address] Information is for End User's use only and may not be sold, redistributed or otherwise used for commercial purposes. The above information is an  only. It is not intended as medical advice for individual conditions or treatments. Talk to your doctor, nurse or pharmacist before following any medical regimen to see if it is safe and effective for you.

## 2018-05-03 NOTE — MR AVS SNAPSHOT
Hemal Hernández [address] [facility] [address] [facility] [address] [address] 
[phone] Patient: Briseyda Hernandez MRN: [id number] IJR:[birth year] Visit Information Date & Time Provider Department Dept. Phone Encounter #  
 5/3/2018  8:15 AM Shikha Ramos, [address] [place] [address] [phone] [id number] Follow-up Instructions Return in about 2 weeks (around 5/17/2018) for 30 minute slot if possible- after bone scan done. Sheila Covington Upcoming Health Maintenance Date Due Pneumococcal 19-64 Highest Risk (1 of 3 - PCV13) 7/8/1979 DTaP/Tdap/Td series (1 - Tdap) 7/8/1981 FOBT Q 1 YEAR AGE 50-75 7/8/2010 EYE EXAM RETINAL OR DILATED Q1 1/5/2018 HEMOGLOBIN A1C Q6M 3/11/2018 MEDICARE YEARLY EXAM 3/28/2018 Influenza Age 5 to Adult 8/1/2018 MICROALBUMIN Q1 9/11/2018 LIPID PANEL Q1 9/11/2018 FOOT EXAM Q1 9/29/2018 BREAST CANCER SCRN MAMMOGRAM 6/16/2019 PAP AKA CERVICAL CYTOLOGY 6/1/2020 Allergies as of 5/3/2018  Review Complete On: 5/3/2018 By: Shikha Ramos MD  
 No Known Allergies Current Immunizations  Reviewed on 9/11/2017 Name Date Influenza Vaccine (Quad) PF 9/11/2017 Not reviewed this visit You Were Diagnosed With   
  
 Codes Comments Uncontrolled type 2 diabetes mellitus without complication, with long-term current use of insulin ([facility] [address]    -  Primary ICD-10-CM: E11.65, Z79.4 ICD-9-CM: 250.02, V58.67 History of left breast cancer     ICD-10-CM: Z85.3 ICD-9-CM: V10.3 Abnormal MRI, lumbar spine     ICD-10-CM: R93.7 ICD-9-CM: 793.7 Spinal stenosis of lumbar region at multiple levels     ICD-10-CM: M48.061 
ICD-9-CM: 724.02 by MRI 2/2018 Vitals BP Pulse Temp Resp Height(growth percentile) Weight(growth percentile) 106/57 65 97.2 °F (36.2 °C) (Oral) 16 5' 2\" (1.575 m) 241 lb 9.6 oz (109.6 kg) BMI OB Status Smoking Status 44.19 kg/m2 Menopause Never Smoker BMI and BSA Data Body Mass Index Body Surface Area  
 44.19 kg/m 2 2.19 m 2 Preferred Pharmacy Pharmacy Name Phone [id number], [address] [phone] Your Updated Medication List  
  
   
This list is accurate as of 5/3/18  9:11 AM.  Always use your most recent med list.  
  
  
  
  
 atorvastatin 40 mg tablet Commonly known as:  LIPITOR Take 1 Tab by mouth daily. Cholecalciferol (Vitamin D3) 2,000 unit Cap capsule Commonly known as:  VITAMIN D3 Take 2,000 Units by mouth daily. exemestane 25 mg tablet Commonly known as:  AROMASIN  
TAKE 1 TABLET BY MOUTH EVERY DAY  
  
 glipiZIDE 10 mg tablet Commonly known as:  Omar Best Take 5 mg by mouth two (2) times a day. glucose blood VI test strips strip Commonly known as:  FREESTYLE TEST Use to check blood sugar twice daily for DM II E11.65  
  
 insulin glargine 100 unit/mL (3 mL) Inpn Commonly known as:  LANTUS SOLOSTAR U-100 INSULIN  
10 Units by SubCUTAneous route nightly. Insulin Needles (Disposable) 30 gauge x 1/3\" Use to inject lantus daily sq.  
  
 lisinopril 5 mg tablet Commonly known as:  Julio César Jocelyne Take  by mouth daily. metFORMIN  mg tablet Commonly known as:  GLUCOPHAGE XR Take 2 Tabs by mouth daily (with dinner). methocarbamol 500 mg tablet Commonly known as:  ROBAXIN  
TAKE 1 TABLET BY MOUTH 4 TIMES DAILY FOR 10 DAYS  
  
 naproxen 500 mg tablet Commonly known as:  NAPROSYN  
TAKE 1 TABLET BY MOUTH TWICE DAILY  
  
 nystatin-triamcinolone topical cream  
Commonly known as:  MYCOLOG II Apply  to affected area two (2) times daily as needed for Skin Irritation. oxyCODONE-acetaminophen 5-325 mg per tablet Commonly known as:  PERCOCET TAKE 1 TABLET BY MOUTH EVERY 4 HOURS AS NEEDED FOR 10 DAYS Follow-up Instructions  Return in about 2 weeks (around 5/17/2018) for 30 minute slot if possible- after bone scan done. Griselda Jarrett To-Do List   
 05/03/2018 Imaging:  NM BONE SCAN [facility] BODY   
  
 06/18/2018  9:00 AM  
  Appointment with [address] 2 at [address] [facility] ([phone]) EXAM INSTRUCTIONS -Remove deodorant, powder and/or perfume prior to exam. -If you are currently nursing, breast should be emptied prior to exam.  GENERAL INSTRUCTIONS -If you have a hand-written prescription for this exam, you must bring it with you to your appointment. -You may be responsible for any co-payments and deductibles as indicated by your insurance carrier. -Only patients will be allowed in the exam room, including children. -Children under the age of 15 may not be left unattended. -Bring all relevant prior images from facilities outside of [facility]. Failure to provide images may delay reading by Radiologist. -[address] [place] [address] patients must have an armband and be accompanied by a caregiver or family member. APPOINTMENT CANCELLATION To reschedule or cancel an appointment, please contact the Scheduling Department at [phone].  
  
 06/18/2018  9:30 AM  
  Appointment with Slade Lopez Joseemeritacasandra at [address] [facility] ([phone]) DIET RESTRICTIONS DO NOT TAKE CALCIUM SUPPLIMENTS FOR 24 HRS PRIOR TO TESTS. GENERAL INSTRUCTIONS -If you have a hand-written prescription for this exam, you must bring it with you to your appointment. -You may be responsible for any co-payments and deductibles as indicated by your insurance carrier. -Only patients will be allowed in the exam room, including children. -Children under the age of 15 may not be left unattended. -[address] patients must have an armband and be accompanied by a caregiver or family member. APPOINTMENT CANCELLATION To reschedule or cancel an appointment, please contact the Scheduling Department at [phone]. Patient Instructions Please call your oncologist to get a follow up sooner. [facility] will call you today to schedule a bone scan. Call them tomorrow [phone] to set this up if they do not call you today. Venlafaxine (By mouth) Venlafaxine ([id number]-[id number]-[id number]) Treats depression, generalized anxiety disorder, panic disorder, and social anxiety disorder. Brand Name(s): Effexor XR There may be other brand names for this medicine. When This Medicine Should Not Be Used: This medicine is not right for everyone. Do not use it if you had an allergic reaction to venlafaxine or desvenlafaxine succinate. How to Use This Medicine:  
Long Acting Capsule, Tablet, Long Acting Tablet · Take your medicine as directed. Your dose may need to be changed several times to find what works best for you. · It is best to take the extended-release capsule at the same time each day (either in the morning or evening). · It is best to take this medicine with food or milk. · Swallow the extended-release capsule whole. Do not crush, break, or chew it. Do not place the capsule in a liquid. · If you cannot swallow the extended-release capsule, you may open it and pour the medicine into a small amount of soft food such as pudding, yogurt, or applesauce. Stir this mixture well and swallow it without chewing. · This medicine should come with a Medication Guide. Ask your pharmacist for a copy if you do not have one. · Missed dose: Take a dose as soon as you remember. If it is almost time for your next dose, wait until then and take a regular dose. Do not take extra medicine to make up for a missed dose. · Store the medicine in a closed container at room temperature, away from heat, moisture, and direct light. Drugs and Foods to Avoid: Ask your doctor or pharmacist before using any other medicine, including over-the-counter medicines, vitamins, and herbal products.  
· Do not use this medicine if you have used an MAO inhibitor within the past 14 days. Do not take an MAO inhibitor for at least 7 days after you stop this medicine. · Some medicines can affect how venlafaxine works. Tell your doctor if you are using any of the following:  
¨ Buspirone, cimetidine, fentanyl, ketoconazole, lithium, metoprolol, mirtazapine, Efren's wort, tramadol, or tryptophan supplements ¨ Amphetamines ¨ Blood thinner (including warfarin) ¨ Diuretic (water pill) ¨ Medicine for migraine headaches ¨ Medicine to lose weight (including phentermine) ¨ NSAID pain or arthritis medicine (including aspirin, celecoxib, diclofenac, ibuprofen, naproxen) ¨ Tricyclic antidepressant · Do not drink alcohol while you are using this medicine. · Tell your doctor if you use anything else that makes you sleepy. Some examples are allergy medicine, narcotic pain medicine, and alcohol. Warnings While Using This Medicine: · Tell your doctor if you are pregnant or breastfeeding, or if you have kidney disease, liver disease, glaucoma, heart disease, high blood pressure, or thyroid problems. Tell your doctor if you have a history of elyse, seizures, heart attack, or stroke. · This medicine can increase thoughts of suicide. Tell your doctor right away if you start to feel depressed and have thoughts about hurting yourself. · This medicine may cause the following problems:  
¨ Serotonin syndrome (when used with certain medicines) ¨ Increased cholesterol levels ¨ Increased blood pressure ¨ Increased risk of bleeding problems ¨ Low sodium levels ¨ Interstitial lung disease and eosinophilic pneumonia · This medicine may make you dizzy or drowsy. Do not drive or do anything that could be dangerous until you know how this medicine affects you. · Do not stop using this medicine suddenly. Your doctor will need to slowly decrease your dose before you stop it completely.  
· Tell any doctor or dentist who treats you that you are using this medicine. This medicine may affect certain medical test results. · Your doctor will do lab tests at regular visits to check on the effects of this medicine. Keep all appointments. · Keep all medicine out of the reach of children. Never share your medicine with anyone. Possible Side Effects While Using This Medicine:  
Call your doctor right away if you notice any of these side effects: · Allergic reaction: Itching or hives, swelling in your face or hands, swelling or tingling in your mouth or throat, chest tightness, trouble breathing · Anxiety, restlessness, fever, sweating, muscle spasms, nausea, vomiting, diarrhea, seeing or hearing things that are not there · Blistering, peeling, red skin rash · Chest pain, cough, trouble breathing · Confusion, weakness, and muscle twitching · Eye pain, vision changes, seeing halos around lights · Fast or pounding heartbeat · Feeling more excited or energetic than usual 
· Headache, trouble concentrating, memory problems, unsteadiness · Seizures · Unusual behavior, thoughts of hurting yourself or others, trouble sleeping, nervousness, unusual dreams · Unusual bleeding or bruising If you notice these less serious side effects, talk with your doctor: · Dry mouth · Mild nausea, constipation, vomiting, loss of appetite, weight loss · Sexual problems · Sleepiness or unusual drowsiness, dizziness If you notice other side effects that you think are caused by this medicine, tell your doctor. Call your doctor for medical advice about side effects. You may report side effects to FDA at 0-816-FDA-4803 © 2017 [address] Information is for End User's use only and may not be sold, redistributed or otherwise used for commercial purposes. The above information is an  only. It is not intended as medical advice for individual conditions or treatments.  Talk to your doctor, nurse or pharmacist before following any medical regimen to see if it is safe and effective for you. Introducing [facility]! [employer] introduces DueDil patient portal. Now you can access parts of your medical record, email your doctor's office, and request medication refills online. 1. In your internet browser, go to [email or web address] 2. Click on the First Time User? Click Here link in the Sign In box. You will see the New Member Sign Up page. 3. Enter your DueDil Access Code exactly as it appears below. You will not need to use this code after youve completed the sign-up process. If you do not sign up before the expiration date, you must request a new code. · DueDil Access Code: [email or web address] Expires: 8/1/2018  9:10 AM 
 
4. Enter the last four digits of your Social Security Number (xxxx) and Date of Birth (mm/dd/yyyy) as indicated and click Submit. You will be taken to the next sign-up page. 5. Create a DueDil ID. This will be your DueDil login ID and cannot be changed, so think of one that is secure and easy to remember. 6. Create a DueDil password. You can change your password at any time. 7. Enter your Password Reset Question and Answer. This can be used at a later time if you forget your password. 8. Enter your e-mail address. You will receive e-mail notification when new information is available in [address] 9. Click Sign Up. You can now view and download portions of your medical record. 10. Click the Download Summary menu link to download a portable copy of your medical information. If you have questions, please visit the Frequently Asked Questions section of the DueDil website. Remember, DueDil is NOT to be used for urgent needs. For medical emergencies, dial 911. Now available from your iPhone and Android! Please provide this summary of care documentation to your next provider. Your primary care clinician is listed as NADER Mueller.  If you have any questions after today's visit, please call [phone].

## 2018-05-03 NOTE — PROGRESS NOTES
Kendra Lucio is a 62 y.o. female and presents with Follow Up Chronic Condition; Diabetes; and Vitamin D Deficiency       Subjective:  Missed 1 week follow up. DM- \"I fell off the wagon\"- stressed about family death- was not using insulin or watching diet- not checking bs. No polyuria or polydipsia. Noncompliance- explored this with pt. Gets some panic attacks while driving, but denies anhedonia or feeling overwhelmed, no SI/HI. occas feels sad but this is rare  Right low back pain- resolved- reviewed MRI with pt. Possible metastatic dz and spinal stenosis. Assessment/Plan:    Diabetes- likely uncontrolled- again stressed the importance of this and reviewed DM complications in detail with pt. She indicates understanding. Noncompliance- no definite depression but some anxiety and panic attacks- pt interested in trying a medication- will start effexor at low dose- risks/benefits/possible side effects also discussed in detail,. Right low back pain- resolved. Reviewed MRI with pt- one abnormal S3 vertebral body signal- possible metastatic dz with h/o breast cancer- needs whole body bone scan- discussed in detail as well. Pt asked to make sooner f/u with oncology if possible. Spinal stenosis- previous back pain resolved. Wt loss encouraged. RTC in 1- 2 weeks- importance of this stressed with pt. After bone scan. Orders Placed This Encounter    NM BONE SCAN [address] BODY     Standing Status:   Future     Standing Expiration Date:   6/3/2019     Scheduling Instructions:      [facility]       Diagnoses and all orders for this visit:    1. Uncontrolled type 2 diabetes mellitus without complication, with long-term current use of insulin ([address]    2. History of left breast cancer  -     NM BONE SCAN [facility] BODY; Future    3. Abnormal MRI, lumbar spine  -     NM BONE SCAN [facility] BODY; Future    4.  Spinal stenosis of lumbar region at multiple levels  Comments:  by MRI 2/2018        ROS:  Negative except as mentioned above  Cardiac- no chest pain or palpitations  Pulmonary- no sob or wheezes  GI- no n/v or diarrhea. SH:  Social History   Substance Use Topics    Smoking status: Never Smoker    Smokeless tobacco: Never Used    Alcohol use Yes      Comment: social         Medications/Allergies:  Current Outpatient Prescriptions on File Prior to Visit   Medication Sig Dispense Refill    methocarbamol (ROBAXIN) 500 mg tablet TAKE 1 TABLET BY MOUTH 4 TIMES DAILY FOR 10 DAYS  0    oxyCODONE-acetaminophen (PERCOCET) 5-325 mg per tablet TAKE 1 TABLET BY MOUTH EVERY 4 HOURS AS NEEDED FOR 10 DAYS  0    naproxen (NAPROSYN) 500 mg tablet TAKE 1 TABLET BY MOUTH TWICE DAILY 60 Tab 1    exemestane (AROMASIN) 25 mg tablet TAKE 1 TABLET BY MOUTH EVERY DAY  6    metFORMIN ER (GLUCOPHAGE XR) 750 mg tablet Take 2 Tabs by mouth daily (with dinner). 60 Tab 5    Cholecalciferol, Vitamin D3, (VITAMIN D3) 2,000 unit cap capsule Take 2,000 Units by mouth daily. 90 Cap 3    nystatin-triamcinolone (MYCOLOG II) topical cream Apply  to affected area two (2) times daily as needed for Skin Irritation. 30 g 1    glucose blood VI test strips (FREESTYLE TEST) strip Use to check blood sugar twice daily for DM II E11.65 200 Strip 3    insulin glargine (LANTUS SOLOSTAR) 100 unit/mL (3 mL) inpn 10 Units by SubCUTAneous route nightly. (Patient taking differently: 21 Units by SubCUTAneous route nightly. 21 units bedtime) 15 mL 3    Insulin Needles, Disposable, 30 gauge x 1/3\" Use to inject lantus daily sq. 100 Pen Needle 11    atorvastatin (LIPITOR) 40 mg tablet Take 1 Tab by mouth daily. 90 Tab 3    lisinopril (PRINIVIL, ZESTRIL) 5 mg tablet Take  by mouth daily.  glipiZIDE (GLUCOTROL) 10 mg tablet Take 5 mg by mouth two (2) times a day. No current facility-administered medications on file prior to visit.            No Known Allergies    Objective:  Visit Vitals    /57    Pulse 65    Temp 97.2 °F (36.2 °C) (Oral)    Resp 16    Ht 5' 2\" (1.575 m)    Wt 241 lb 9.6 oz (109.6 kg)    BMI 44.19 kg/m2    Body mass index is 44.19 kg/(m^2). Constitutional: Well developed, nourished, no distress, alert, obese   CV: S1, S2.  RRR. No murmurs/rubs. No thrills palpated. No carotid bruits. Intact distal pulses. No edema. Pulm: No abnormalities on inspection. Clear to auscultation bilaterally. No wheezing/rhonchi. Normal effort. GI: Soft, nontender, nondistended. Normal active bowel sounds. No  masses on palpation. No hepatosplenomegaly.

## 2018-05-03 NOTE — PROGRESS NOTES
1. Have you been to the ER, urgent care clinic since your last visit? Hospitalized since your last visit? Yes, went [facility] in 4/2018 for constant cough. 2. Have you seen or consulted any other health care providers outside of the [address] [place] [address] since your last visit? Include any pap smears or colon screening.  No.     Chief Complaint   Patient presents with    Follow Up Chronic Condition    Diabetes    Vitamin D Deficiency

## 2018-05-04 ENCOUNTER — TELEPHONE (OUTPATIENT)
Dept: FAMILY MEDICINE CLINIC | Age: 58
End: 2018-05-04

## 2018-05-04 LAB
25(OH)D3 SERPL-MCNC: 36 NG/ML (ref 32–100)
A-G RATIO,AGRAT: 1.6 RATIO (ref 1.1–2.6)
ABSOLUTE LYMPHOCYTE COUNT, 10803: 1.9 K/UL (ref 1–4.8)
ALBUMIN SERPL-MCNC: 4.2 G/DL (ref 3.5–5)
ALP SERPL-CCNC: 92 U/L (ref 25–115)
ALT SERPL-CCNC: 21 U/L (ref 5–40)
ANION GAP SERPL CALC-SCNC: 15 MMOL/L
AST SERPL W P-5'-P-CCNC: 15 U/L (ref 10–37)
AVG GLU, 10930: 259 MG/DL (ref 91–123)
BASOPHILS # BLD: 0 K/UL (ref 0–0.2)
BASOPHILS NFR BLD: 0 % (ref 0–2)
BILIRUB SERPL-MCNC: 0.3 MG/DL (ref 0.2–1.2)
BUN SERPL-MCNC: 13 MG/DL (ref 6–22)
CALCIUM SERPL-MCNC: 8.9 MG/DL (ref 8.4–10.5)
CHLORIDE SERPL-SCNC: 103 MMOL/L (ref 98–110)
CO2 SERPL-SCNC: 24 MMOL/L (ref 20–32)
CREAT SERPL-MCNC: 0.7 MG/DL (ref 0.5–1.2)
EOSINOPHIL # BLD: 0.2 K/UL (ref 0–0.5)
EOSINOPHIL NFR BLD: 2 % (ref 0–6)
ERYTHROCYTE [DISTWIDTH] IN BLOOD BY AUTOMATED COUNT: 13.6 % (ref 10–15.5)
GFRAA, 66117: >60
GFRNA, 66118: >60
GLOBULIN,GLOB: 2.6 G/DL (ref 2–4)
GLUCOSE SERPL-MCNC: 212 MG/DL (ref 70–99)
GRANULOCYTES,GRANS: 64 % (ref 40–75)
HBA1C MFR BLD HPLC: 10.6 % (ref 4.8–5.9)
HCT VFR BLD AUTO: 43.1 % (ref 35.1–48)
HGB BLD-MCNC: 13.9 G/DL (ref 11.7–16)
LYMPHOCYTES, LYMLT: 27 % (ref 20–45)
MCH RBC QN AUTO: 29 PG (ref 26–34)
MCHC RBC AUTO-ENTMCNC: 32 G/DL (ref 31–36)
MCV RBC AUTO: 89 FL (ref 80–95)
MONOCYTES # BLD: 0.4 K/UL (ref 0.1–1)
MONOCYTES NFR BLD: 6 % (ref 3–12)
NEUTROPHILS # BLD AUTO: 4.3 K/UL (ref 1.8–7.7)
PLATELET # BLD AUTO: 222 K/UL (ref 140–440)
PMV BLD AUTO: 12 FL (ref 9–13)
POTASSIUM SERPL-SCNC: 4.6 MMOL/L (ref 3.5–5.5)
PROT SERPL-MCNC: 6.8 G/DL (ref 6.4–8.3)
RBC # BLD AUTO: 4.83 M/UL (ref 3.8–5.2)
SODIUM SERPL-SCNC: 142 MMOL/L (ref 133–145)
WBC # BLD AUTO: 6.8 K/UL (ref 4–11)

## 2018-05-04 RX ORDER — VENLAFAXINE HYDROCHLORIDE 37.5 MG/1
37.5 CAPSULE, EXTENDED RELEASE ORAL DAILY
Qty: 90 CAP | Refills: 1 | Status: SHIPPED | OUTPATIENT
Start: 2018-05-04

## 2018-05-08 ENCOUNTER — HOSPITAL ENCOUNTER (OUTPATIENT)
Dept: NUCLEAR MEDICINE | Age: 58
Discharge: HOME | End: 2018-05-08
Attending: INTERNAL MEDICINE
Payer: MEDICAID

## 2018-05-08 DIAGNOSIS — R93.7 ABNORMAL MRI, LUMBAR SPINE: ICD-10-CM

## 2018-05-08 DIAGNOSIS — Z85.3 HISTORY OF LEFT BREAST CANCER: ICD-10-CM

## 2018-05-08 PROCEDURE — 78306 BONE IMAGING WHOLE BODY: CPT

## 2018-05-09 ENCOUNTER — DOCUMENTATION ONLY (OUTPATIENT)
Dept: FAMILY MEDICINE CLINIC | Age: 58
End: 2018-05-09

## 2018-05-09 NOTE — PROGRESS NOTES
I spoke to patient, per Dr. Yuliya Stack her Bone scan was normal and still follow up with her oncologist.

## 2018-05-17 DIAGNOSIS — M54.50 ACUTE LEFT-SIDED LOW BACK PAIN WITHOUT SCIATICA: ICD-10-CM

## 2018-05-17 DIAGNOSIS — C50.912 MALIGNANT NEOPLASM OF LEFT FEMALE BREAST, UNSPECIFIED ESTROGEN RECEPTOR STATUS, UNSPECIFIED SITE OF BREAST (HCC): ICD-10-CM

## 2018-05-18 RX ORDER — NAPROXEN 500 MG/1
TABLET ORAL
Qty: 60 TAB | Refills: 1 | Status: SHIPPED | OUTPATIENT
Start: 2018-05-18

## 2018-05-21 ENCOUNTER — OFFICE VISIT (OUTPATIENT)
Dept: FAMILY MEDICINE CLINIC | Age: 58
End: 2018-05-21

## 2018-05-21 VITALS — RESPIRATION RATE: 17 BRPM | TEMPERATURE: 97.8 F | WEIGHT: 235 LBS | BODY MASS INDEX: 43.24 KG/M2 | HEIGHT: 62 IN

## 2018-05-21 DIAGNOSIS — E66.01 OBESITY, MORBID (HCC): ICD-10-CM

## 2018-05-21 DIAGNOSIS — E55.9 VITAMIN D DEFICIENCY: ICD-10-CM

## 2018-05-21 NOTE — PATIENT INSTRUCTIONS
Body Mass Index: Care Instructions  Your Care Instructions    Body mass index (BMI) can help you see if your weight is raising your risk for health problems. It uses a formula to compare how much you weigh with how tall you are. · A BMI lower than 18.5 is considered underweight. · A BMI between 18.5 and 24.9 is considered healthy. · A BMI between 25 and 29.9 is considered overweight. A BMI of 30 or higher is considered obese. If your BMI is in the normal range, it means that you have a lower risk for weight-related health problems. If your BMI is in the overweight or obese range, you may be at increased risk for weight-related health problems, such as high blood pressure, heart disease, stroke, arthritis or joint pain, and diabetes. If your BMI is in the underweight range, you may be at increased risk for health problems such as fatigue, lower protection (immunity) against illness, muscle loss, bone loss, hair loss, and hormone problems. BMI is just one measure of your risk for weight-related health problems. You may be at higher risk for health problems if you are not active, you eat an unhealthy diet, or you drink too much alcohol or use tobacco products. Follow-up care is a key part of your treatment and safety. Be sure to make and go to all appointments, and call your doctor if you are having problems. It's also a good idea to know your test results and keep a list of the medicines you take. How can you care for yourself at home? · Practice healthy eating habits. This includes eating plenty of fruits, vegetables, whole grains, lean protein, and low-fat dairy. · If your doctor recommends it, get more exercise. Walking is a good choice. Bit by bit, increase the amount you walk every day. Try for at least 30 minutes on most days of the week. · Do not smoke. Smoking can increase your risk for health problems. If you need help quitting, talk to your doctor about stop-smoking programs and medicines. These can increase your chances of quitting for good. · Limit alcohol to 2 drinks a day for men and 1 drink a day for women. Too much alcohol can cause health problems. If you have a BMI higher than 25  · Your doctor may do other tests to check your risk for weight-related health problems. This may include measuring the distance around your waist. A waist measurement of more than 40 inches in men or 35 inches in women can increase the risk of weight-related health problems. · Talk with your doctor about steps you can take to stay healthy or improve your health. You may need to make lifestyle changes to lose weight and stay healthy, such as changing your diet and getting regular exercise. If you have a BMI lower than 18.5  · Your doctor may do other tests to check your risk for health problems. · Talk with your doctor about steps you can take to stay healthy or improve your health. You may need to make lifestyle changes to gain or maintain weight and stay healthy, such as getting more healthy foods in your diet and doing exercises to build muscle. Where can you learn more? Go to http://pippa-venkatesh.info/. Enter S176 in the search box to learn more about \"Body Mass Index: Care Instructions. \"  Current as of: October 13, 2016  Content Version: 11.4  © 3436-2904 Healthwise, Incorporated. Care instructions adapted under license by [employer] (which disclaims liability or warranty for this information). If you have questions about a medical condition or this instruction, always ask your healthcare professional. [place] [address] any warranty or liability for your use of this information. Learning About Vitamin D  Why is it important to get enough vitamin D? Your body needs vitamin D to absorb calcium. Calcium keeps your bones and muscles, including your heart, healthy and strong. If your muscles don't get enough calcium, they can cramp, hurt, or feel weak. You may have long-term (chronic) muscle aches and pains. If you don't get enough vitamin D throughout life, you have an increased chance of having thin and brittle bones (osteoporosis) in your later years. Children who don't get enough vitamin D may not grow as much as others their age. They also have a chance of getting a rare disease called rickets. It causes weak bones. Vitamin D and calcium are added to many foods. And your body uses sunshine to make its own vitamin D. How much vitamin D do you need? The [place] of Medicine recommends that people ages 3 through 79 get 600 IU (international units) every day. Adults 71 and older need 800 IU every day. Blood tests for vitamin D can check your vitamin D level. But there is no standard normal range used by all laboratories. The [place] of Medicine recommends a blood level of 20 ng/mL of vitamin D for healthy bones. And most people in the [place] and [facility] ([facility]) meet this goal.  How can you get more vitamin D? Foods that contain vitamin D include:  · [place], tuna, and mackerel. These are some of the best foods to eat when you need to get more vitamin D.  · Cheese, egg yolks, and beef liver. These foods have vitamin D in small amounts. · Milk, soy drinks, orange juice, yogurt, margarine, and some kinds of cereal have vitamin D added to them. Some people don't make vitamin D as well as others. They may have to take extra care in getting enough vitamin D. Things that reduce how much vitamin D your body makes include:  · Dark skin, such as many  Americans have. · Age, especially if you are older than 72. · Digestive problems, such as Crohn's or celiac disease. · Liver and kidney disease. Some people who do not get enough vitamin D may need supplements. Are there any risks from taking vitamin D?  · Too much vitamin D:  ¨ Can damage your kidneys. ¨ Can cause nausea and vomiting, constipation, and weakness.   ¨ Raises the amount of calcium in your blood. If this happens, you can get confused or have an irregular heart rhythm. · Vitamin D may interact with other medicines. Tell your doctor about all of the medicines you take, including over-the-counter drugs, herbs, and pills. Tell your doctor about all of your current medical problems. Where can you learn more? Go to http://pippa-venkatesh.info/. Enter [id number] in the search box to learn more about \"Learning About Vitamin D.\"  Current as of: May 12, 2017  Content Version: 11.4  © 8861-5050 [employer]. Care instructions adapted under license by [employer] (which disclaims liability or warranty for this information). If you have questions about a medical condition or this instruction, always ask your healthcare professional. [address] any warranty or liability for your use of this information.

## 2018-05-21 NOTE — MR AVS SNAPSHOT
Hemal Hernández [address] [facility] [address] [facility] [phone] [phone] 
[phone] Patient: Evette Wilkes MRN: [id number] KMW:[id number] Visit Information Date & Time Provider Department Dept. Phone Encounter #  
 5/21/2018  1:30 PM Vanessa Basilio, [address] [place] [address] [phone] [id number] Follow-up Instructions Return in about 3 months (around 8/21/2018) for with labs prior. [place] Upcoming Health Maintenance Date Due FOBT Q 1 YEAR AGE 50-75 7/8/2010 MEDICARE YEARLY EXAM 3/28/2018 Pneumococcal 19-64 Highest Risk (1 of 3 - PCV13) 8/15/2018* DTaP/Tdap/Td series (1 - Tdap) 8/15/2018* Influenza Age 5 to Adult 8/1/2018 MICROALBUMIN Q1 9/11/2018 LIPID PANEL Q1 9/11/2018 FOOT EXAM Q1 9/29/2018 HEMOGLOBIN A1C Q6M 11/3/2018 EYE EXAM RETINAL OR DILATED Q1 3/27/2019 BREAST CANCER SCRN MAMMOGRAM 6/16/2019 PAP AKA CERVICAL CYTOLOGY 6/1/2020 *Topic was postponed. The date shown is not the original due date. Allergies as of 5/21/2018  Review Complete On: 5/21/2018 By: Vanessa Basilio MD  
 No Known Allergies Current Immunizations  Reviewed on 9/11/2017 Name Date Influenza Vaccine (Quad) PF 9/11/2017 Not reviewed this visit You Were Diagnosed With   
  
 Codes Comments Uncontrolled type 2 diabetes mellitus without complication, with long-term current use of insulin ([facility] [address]    -  Primary ICD-10-CM: E11.65, Z79.4 ICD-9-CM: 250.02, V58.67 Vitamin D deficiency     ICD-10-CM: E55.9 ICD-9-CM: 268.9 Vitals Temp Resp Height(growth percentile) Weight(growth percentile) BMI OB Status 97.8 °F (36.6 °C) (Oral) 17 5' 2\" (1.575 m) 235 lb (106.6 kg) 42.98 kg/m2 Menopause Smoking Status Never Smoker Vitals History BMI and BSA Data Body Mass Index Body Surface Area 42.98 kg/m 2 2.16 m 2 Preferred Pharmacy Pharmacy Name Phone [id number], [address] [facility] Hilda Burke [phone] Your Updated Medication List  
  
   
This list is accurate as of 5/21/18  1:44 PM.  Always use your most recent med list.  
  
  
  
  
 atorvastatin 40 mg tablet Commonly known as:  LIPITOR Take 1 Tab by mouth daily. Cholecalciferol (Vitamin D3) 2,000 unit Cap capsule Commonly known as:  VITAMIN D3 Take 2,000 Units by mouth daily. exemestane 25 mg tablet Commonly known as:  AROMASIN  
TAKE 1 TABLET BY MOUTH EVERY DAY  
  
 glipiZIDE 10 mg tablet Commonly known as:  Love Shack Take 5 mg by mouth two (2) times a day. glucose blood VI test strips strip Commonly known as:  FREESTYLE TEST Use to check blood sugar twice daily for DM II E11.65  
  
 insulin glargine 100 unit/mL (3 mL) Inpn Commonly known as:  LANTUS SOLOSTAR U-100 INSULIN  
10 Units by SubCUTAneous route nightly. Insulin Needles (Disposable) 30 gauge x 1/3\" Use to inject lantus daily sq.  
  
 lisinopril 5 mg tablet Commonly known as:  Aurora Drought Take  by mouth daily. metFORMIN  mg tablet Commonly known as:  GLUCOPHAGE XR Take 2 Tabs by mouth daily (with dinner). methocarbamol 500 mg tablet Commonly known as:  ROBAXIN  
TAKE 1 TABLET BY MOUTH 4 TIMES DAILY FOR 10 DAYS  
  
 naproxen 500 mg tablet Commonly known as:  NAPROSYN  
TAKE 1 TABLET BY MOUTH TWICE DAILY  
  
 nystatin-triamcinolone topical cream  
Commonly known as:  MYCOLOG II Apply  to affected area two (2) times daily as needed for Skin Irritation. oxyCODONE-acetaminophen 5-325 mg per tablet Commonly known as:  PERCOCET TAKE 1 TABLET BY MOUTH EVERY 4 HOURS AS NEEDED FOR 10 DAYS  
  
 venlafaxine-SR 37.5 mg capsule Commonly known as:  EFFEXOR-XR Take 1 Cap by mouth daily. Follow-up Instructions Return in about 3 months (around 8/21/2018) for with labs prior. Osmel Bond To-Do List   
 05/21/2018 Lab:  HEMOGLOBIN A1C WITH EAG   
  
 05/21/2018 Lab:  VITAMIN D, 25 HYDROXY   
  
 06/18/2018  9:00 AM  
  Appointment with [address] at [address] [place] [address] ([phone]) EXAM INSTRUCTIONS -Remove deodorant, powder and/or perfume prior to exam. -If you are currently nursing, breast should be emptied prior to exam.  GENERAL INSTRUCTIONS -If you have a hand-written prescription for this exam, you must bring it with you to your appointment. -You may be responsible for any co-payments and deductibles as indicated by your insurance carrier. -Only patients will be allowed in the exam room, including children. -Children under the age of 15 may not be left unattended. -Bring all relevant prior images from facilities outside of [facility]. Failure to provide images may delay reading by Radiologist. -[address] [place] [address] patients must have an armband and be accompanied by a caregiver or family member. APPOINTMENT CANCELLATION To reschedule or cancel an appointment, please contact the Scheduling Department at [phone].  
  
 06/18/2018  9:30 AM  
  Appointment with Slade Milligan at [address] [place] [address] ([phone]) DIET RESTRICTIONS DO NOT TAKE CALCIUM SUPPLIMENTS FOR 24 HRS PRIOR TO TESTS. GENERAL INSTRUCTIONS -If you have a hand-written prescription for this exam, you must bring it with you to your appointment. -You may be responsible for any co-payments and deductibles as indicated by your insurance carrier. -Only patients will be allowed in the exam room, including children. -Children under the age of 15 may not be left unattended. -[facility] [facility] patients must have an armband and be accompanied by a caregiver or family member. APPOINTMENT CANCELLATION To reschedule or cancel an appointment, please contact the Scheduling Department at [phone]. Patient Instructions Body Mass Index: Care Instructions Your Care Instructions Body mass index (BMI) can help you see if your weight is raising your risk for health problems. It uses a formula to compare how much you weigh with how tall you are. · A BMI lower than 18.5 is considered underweight. · A BMI between 18.5 and 24.9 is considered healthy. · A BMI between 25 and 29.9 is considered overweight. A BMI of 30 or higher is considered obese. If your BMI is in the normal range, it means that you have a lower risk for weight-related health problems. If your BMI is in the overweight or obese range, you may be at increased risk for weight-related health problems, such as high blood pressure, heart disease, stroke, arthritis or joint pain, and diabetes. If your BMI is in the underweight range, you may be at increased risk for health problems such as fatigue, lower protection (immunity) against illness, muscle loss, bone loss, hair loss, and hormone problems. BMI is just one measure of your risk for weight-related health problems. You may be at higher risk for health problems if you are not active, you eat an unhealthy diet, or you drink too much alcohol or use tobacco products. Follow-up care is a key part of your treatment and safety. Be sure to make and go to all appointments, and call your doctor if you are having problems. It's also a good idea to know your test results and keep a list of the medicines you take. How can you care for yourself at home? · Practice healthy eating habits. This includes eating plenty of fruits, vegetables, whole grains, lean protein, and low-fat dairy. · If your doctor recommends it, get more exercise. Walking is a good choice. Bit by bit, increase the amount you walk every day. Try for at least 30 minutes on most days of the week. · Do not smoke. Smoking can increase your risk for health problems.  If you need help quitting, talk to your doctor about stop-smoking programs and medicines. These can increase your chances of quitting for good. · Limit alcohol to 2 drinks a day for men and 1 drink a day for women. Too much alcohol can cause health problems. If you have a BMI higher than 25 · Your doctor may do other tests to check your risk for weight-related health problems. This may include measuring the distance around your waist. A waist measurement of more than 40 inches in men or 35 inches in women can increase the risk of weight-related health problems. · Talk with your doctor about steps you can take to stay healthy or improve your health. You may need to make lifestyle changes to lose weight and stay healthy, such as changing your diet and getting regular exercise. If you have a BMI lower than 18.5 · Your doctor may do other tests to check your risk for health problems. · Talk with your doctor about steps you can take to stay healthy or improve your health. You may need to make lifestyle changes to gain or maintain weight and stay healthy, such as getting more healthy foods in your diet and doing exercises to build muscle. Where can you learn more? Go to http://pippa-venkatesh.info/. Enter S176 in the search box to learn more about \"Body Mass Index: Care Instructions. \" Current as of: October 13, 2016 Content Version: 11.4 © 0018-1668 Healthwise, Incorporated. Care instructions adapted under license by [employer] (which disclaims liability or warranty for this information). If you have questions about a medical condition or this instruction, always ask your healthcare professional. [place] [address] any warranty or liability for your use of this information. Learning About Vitamin D Why is it important to get enough vitamin D? Your body needs vitamin D to absorb calcium. Calcium keeps your bones and muscles, including your heart, healthy and strong.  If your muscles don't get enough calcium, they can cramp, hurt, or feel weak. You may have long-term (chronic) muscle aches and pains. If you don't get enough vitamin D throughout life, you have an increased chance of having thin and brittle bones (osteoporosis) in your later years. Children who don't get enough vitamin D may not grow as much as others their age. They also have a chance of getting a rare disease called rickets. It causes weak bones. Vitamin D and calcium are added to many foods. And your body uses sunshine to make its own vitamin D. How much vitamin D do you need? The [place] of Medicine recommends that people ages 3 through 79 get 600 IU (international units) every day. Adults 71 and older need 800 IU every day. Blood tests for vitamin D can check your vitamin D level. But there is no standard normal range used by all laboratories. The [place] of Medicine recommends a blood level of 20 ng/mL of vitamin D for healthy bones. And most people in the [place] and [facility]) meet this goal. 
How can you get more vitamin D? Foods that contain vitamin D include: 
· [place], tuna, and mackerel. These are some of the best foods to eat when you need to get more vitamin D. 
· Cheese, egg yolks, and beef liver. These foods have vitamin D in small amounts. · Milk, soy drinks, orange juice, yogurt, margarine, and some kinds of cereal have vitamin D added to them. Some people don't make vitamin D as well as others. They may have to take extra care in getting enough vitamin D. Things that reduce how much vitamin D your body makes include: · Dark skin, such as many  Americans have. · Age, especially if you are older than 72. · Digestive problems, such as Crohn's or celiac disease. · Liver and kidney disease. Some people who do not get enough vitamin D may need supplements. Are there any risks from taking vitamin D? 
· Too much vitamin D: 
¨ Can damage your kidneys. ¨ Can cause nausea and vomiting, constipation, and weakness. ¨ Raises the amount of calcium in your blood. If this happens, you can get confused or have an irregular heart rhythm. · Vitamin D may interact with other medicines. Tell your doctor about all of the medicines you take, including over-the-counter drugs, herbs, and pills. Tell your doctor about all of your current medical problems. Where can you learn more? Go to http://pippa-venkatesh.info/. Enter [id number] in the search box to learn more about \"Learning About Vitamin D.\" 
Current as of: May 12, 2017 Content Version: 11.4 © 5687-7062 [employer]. Care instructions adapted under license by [employer] (which disclaims liability or warranty for this information). If you have questions about a medical condition or this instruction, always ask your healthcare professional. [place] [address] any warranty or liability for your use of this information. Introducing [facility]! [employer] introduces Alchip patient portal. Now you can access parts of your medical record, email your doctor's office, and request medication refills online. 1. In your internet browser, go to [email or web address] 2. Click on the First Time User? Click Here link in the Sign In box. You will see the New Member Sign Up page. 3. Enter your Alchip Access Code exactly as it appears below. You will not need to use this code after youve completed the sign-up process. If you do not sign up before the expiration date, you must request a new code. · Alchip Access Code: [email or web address] Expires: 8/1/2018  9:10 AM 
 
4. Enter the last four digits of your Social Security Number (xxxx) and Date of Birth (mm/dd/yyyy) as indicated and click Submit. You will be taken to the next sign-up page. 5. Create a Alchip ID.  This will be your Alchip login ID and cannot be changed, so think of one that is secure and easy to remember. 6. Create a [employer]i password. You can change your password at any time. 7. Enter your Password Reset Question and Answer. This can be used at a later time if you forget your password. 8. Enter your e-mail address. You will receive e-mail notification when new information is available in [address] 9. Click Sign Up. You can now view and download portions of your medical record. 10. Click the Download Summary menu link to download a portable copy of your medical information. If you have questions, please visit the Frequently Asked Questions section of the [employer] website. Remember, Cond[employer]i is NOT to be used for urgent needs. For medical emergencies, dial 911. Now available from your iPhone and Android! Please provide this summary of care documentation to your next provider. Your primary care clinician is listed as NADER Mueller. If you have any questions after today's visit, please call [phone].

## 2018-05-21 NOTE — PROGRESS NOTES
1. Have you been to the ER, urgent care clinic since your last visit? Hospitalized since your last visit? No    2. Have you seen or consulted any other health care providers outside of the [address] [place] [address] since your last visit? Include any pap smears or colon screening. Yes, saw her Podiatrist in May 18, 2018.      Chief Complaint   Patient presents with    Follow Up Chronic Condition    Diabetes

## 2018-05-21 NOTE — PROGRESS NOTES
Mayra Thrasher is a 62 y.o. female and presents with Follow Up Chronic Condition; Diabetes; and Results (labs)       Subjective:    Uncontrolled bs- this am 147, yesterday was 168, then 157. Taking lantus and up to 14 units now. Taking glipizide and metformin also- no hypoglycemia. Doing better following diabetes diet. Obesity- lost 6 lbs since last visit. Assessment/Plan:      Uncontrolled bs- doing much, much better by glucometer readings. a1c still too high but will wait for next check in about 2 months. Continue home bs checks. Obesity- reinforced goal 1-2 lbs/wk wt loss. Caloric restriction emphasized. RTC in 2 months. Orders Placed This Encounter    VITAMIN D, 25 HYDROXY     Standing Status:   Future     Standing Expiration Date:   5/21/2019    HEMOGLOBIN A1C WITH EAG     Standing Status:   Future     Standing Expiration Date:   5/22/2019       Diagnoses and all orders for this visit:    1. Uncontrolled type 2 diabetes mellitus without complication, with long-term current use of insulin (HCC)  -     HEMOGLOBIN A1C WITH EAG; Future    2. Vitamin D deficiency  -     VITAMIN D, 25 HYDROXY; Future    3. Obesity, morbid ([address]          ROS:  Negative except as mentioned above  Cardiac- no chest pain or palpitations  Pulmonary- no sob or wheezes  GI- no n/v or diarrhea. SH:  Social History   Substance Use Topics    Smoking status: Never Smoker    Smokeless tobacco: Never Used    Alcohol use Yes      Comment: social         Medications/Allergies:  Current Outpatient Prescriptions on File Prior to Visit   Medication Sig Dispense Refill    naproxen (NAPROSYN) 500 mg tablet TAKE 1 TABLET BY MOUTH TWICE DAILY 60 Tab 1    venlafaxine-SR (EFFEXOR-XR) 37.5 mg capsule Take 1 Cap by mouth daily.  90 Cap 1    methocarbamol (ROBAXIN) 500 mg tablet TAKE 1 TABLET BY MOUTH 4 TIMES DAILY FOR 10 DAYS  0    oxyCODONE-acetaminophen (PERCOCET) 5-325 mg per tablet TAKE 1 TABLET BY MOUTH EVERY 4 HOURS AS NEEDED FOR 10 DAYS  0    exemestane (AROMASIN) 25 mg tablet TAKE 1 TABLET BY MOUTH EVERY DAY  6    metFORMIN ER (GLUCOPHAGE XR) 750 mg tablet Take 2 Tabs by mouth daily (with dinner). 60 Tab 5    Cholecalciferol, Vitamin D3, (VITAMIN D3) 2,000 unit cap capsule Take 2,000 Units by mouth daily. 90 Cap 3    nystatin-triamcinolone (MYCOLOG II) topical cream Apply  to affected area two (2) times daily as needed for Skin Irritation. 30 g 1    glucose blood VI test strips (FREESTYLE TEST) strip Use to check blood sugar twice daily for DM II E11.65 200 Strip 3    insulin glargine (LANTUS SOLOSTAR) 100 unit/mL (3 mL) inpn 10 Units by SubCUTAneous route nightly. (Patient taking differently: 14 Units by SubCUTAneous route nightly. 14 units bedtime) 15 mL 3    Insulin Needles, Disposable, 30 gauge x 1/3\" Use to inject lantus daily sq. 100 Pen Needle 11    atorvastatin (LIPITOR) 40 mg tablet Take 1 Tab by mouth daily. 90 Tab 3    lisinopril (PRINIVIL, ZESTRIL) 5 mg tablet Take  by mouth daily.  glipiZIDE (GLUCOTROL) 10 mg tablet Take 5 mg by mouth two (2) times a day. No current facility-administered medications on file prior to visit. No Known Allergies    Objective:  Visit Vitals    Temp 97.8 °F (36.6 °C) (Oral)    Resp 17    Ht 5' 2\" (1.575 m)    Wt 235 lb (106.6 kg)    BMI 42.98 kg/m2    Body mass index is 42.98 kg/(m^2). Constitutional: Well developed, nourished, no distress, alert   CV: S1, S2.  RRR. No murmurs/rubs. No thrills palpated. No carotid bruits. Intact distal pulses. No edema. Pulm: No abnormalities on inspection. Clear to auscultation bilaterally. No wheezing/rhonchi. Normal effort. GI: Soft, nontender, nondistended. Normal active bowel sounds. No  masses on palpation. No hepatosplenomegaly.

## 2018-06-08 RX ORDER — GLIPIZIDE 10 MG/1
5 TABLET ORAL 2 TIMES DAILY
Qty: 60 TAB | Refills: 5 | Status: SHIPPED | OUTPATIENT
Start: 2018-06-08

## 2018-07-20 RX ORDER — METFORMIN HYDROCHLORIDE 750 MG/1
TABLET, EXTENDED RELEASE ORAL
Qty: 60 TAB | Refills: 5 | Status: SHIPPED | OUTPATIENT
Start: 2018-07-20